# Patient Record
Sex: FEMALE | Race: AMERICAN INDIAN OR ALASKA NATIVE
[De-identification: names, ages, dates, MRNs, and addresses within clinical notes are randomized per-mention and may not be internally consistent; named-entity substitution may affect disease eponyms.]

---

## 2017-04-19 ENCOUNTER — HOSPITAL ENCOUNTER (EMERGENCY)
Dept: HOSPITAL 5 - ED | Age: 47
Discharge: LEFT BEFORE DISCHARGE | End: 2017-04-19
Payer: COMMERCIAL

## 2017-04-19 VITALS — SYSTOLIC BLOOD PRESSURE: 143 MMHG | DIASTOLIC BLOOD PRESSURE: 78 MMHG

## 2017-04-19 DIAGNOSIS — Z88.0: ICD-10-CM

## 2017-04-19 DIAGNOSIS — R20.8: Primary | ICD-10-CM

## 2017-04-19 DIAGNOSIS — K21.9: ICD-10-CM

## 2017-04-19 DIAGNOSIS — Z88.2: ICD-10-CM

## 2017-04-19 LAB
ANION GAP SERPL CALC-SCNC: 17 MMOL/L
APTT BLD: 38 SEC. (ref 24.2–36.6)
BASOPHILS NFR BLD AUTO: 0.6 % (ref 0–1.8)
BUN SERPL-MCNC: 13 MG/DL (ref 7–17)
BUN/CREAT SERPL: 16.25 %
CALCIUM SERPL-MCNC: 9.3 MG/DL (ref 8.4–10.2)
CHLORIDE SERPL-SCNC: 102.4 MMOL/L (ref 98–107)
CO2 SERPL-SCNC: 24 MMOL/L (ref 22–30)
EOSINOPHIL NFR BLD AUTO: 2.4 % (ref 0–4.3)
GLUCOSE SERPL-MCNC: 113 MG/DL (ref 65–100)
HCT VFR BLD CALC: 38.5 % (ref 30.3–42.9)
HGB BLD-MCNC: 12.1 GM/DL (ref 10.1–14.3)
INR PPP: 0.91 (ref 0.87–1.13)
MCH RBC QN AUTO: 22 PG (ref 28–32)
MCHC RBC AUTO-ENTMCNC: 31 % (ref 30–34)
MCV RBC AUTO: 70 FL (ref 79–97)
PLATELET # BLD: 294 K/MM3 (ref 140–440)
POTASSIUM SERPL-SCNC: 3.8 MMOL/L (ref 3.6–5)
RBC # BLD AUTO: 5.51 M/MM3 (ref 3.65–5.03)
SODIUM SERPL-SCNC: 140 MMOL/L (ref 137–145)
WBC # BLD AUTO: 9.6 K/MM3 (ref 4.5–11)

## 2017-04-19 PROCEDURE — 70450 CT HEAD/BRAIN W/O DYE: CPT

## 2017-04-19 PROCEDURE — 36415 COLL VENOUS BLD VENIPUNCTURE: CPT

## 2017-04-19 PROCEDURE — 84484 ASSAY OF TROPONIN QUANT: CPT

## 2017-04-19 PROCEDURE — 93005 ELECTROCARDIOGRAM TRACING: CPT

## 2017-04-19 PROCEDURE — 93010 ELECTROCARDIOGRAM REPORT: CPT

## 2017-04-19 PROCEDURE — 85610 PROTHROMBIN TIME: CPT

## 2017-04-19 PROCEDURE — 85025 COMPLETE CBC W/AUTO DIFF WBC: CPT

## 2017-04-19 PROCEDURE — 85670 THROMBIN TIME PLASMA: CPT

## 2017-04-19 PROCEDURE — 85730 THROMBOPLASTIN TIME PARTIAL: CPT

## 2017-04-19 PROCEDURE — 80048 BASIC METABOLIC PNL TOTAL CA: CPT

## 2017-04-19 NOTE — CAT SCAN REPORT
Cranial CT without contrast.



History: Acute stroke protocol.



Findings: The brain parenchyma is normal. There is no evidence of 

hemorrhage or infarct. The ventricles are normal in size and contour. 

There are no masses or extra-axial collections. The posterior fossa is 

normal. There is no evidence of hyperdense MCA sign. The calvarium is 

intact. There is minimal mucoperiosteal thickening in the left lateral 

aspect of the sphenoid sinus. The mastoid air cells are normal.



Impression: 1. No intracranial abnormalities.



2. Evidence of minimal sphenoid sinusitis.



Comment: The findings were given by telephone to Dr. Aguayo at 10:10 AM 

on April 19, 2017.

## 2017-04-19 NOTE — EMERGENCY DEPARTMENT REPORT
ED Neuro Deficit HPI





- General


Chief Complaint: Neuro Symptoms/Deficit


Stated Complaint: POSS STROKE


Time Seen by Provider: 04/19/17 09:53


Source: patient


Mode of arrival: Ambulatory


Limitations: No Limitations





- History of Present Illness


Initial Comments: 





46-year-old female presents to the emergency department for evaluation of 

strokelike symptoms.  Patient states she has been having right arm numbness for 

over one week.  This morning at approximately 6:30, she began having right-

sided facial numbness.  She reports numbness to her right cheek and around her 

right eye.  She denies weakness or headache.  There are no other complaints.


-: Gradual, week(s) (1)


Last Observed Normal: 06:30


Location: right face, right arm


Presenting Symptoms: Present: Facial Droop/Numbness (numbness only)


History of same: No


Place: work


Severity: mild


Quality: numb, tingling


Improves With: none


Worsens With: none


On Anticoagulants: No


Context: gradual onset


Associated Symptoms: denies other symptoms


Treatments Prior to Arrival: none





- Related Data


Home Medications: 


 Home Medications











 Medication  Instructions  Recorded  Confirmed  Last Taken


 


No Known Home Medications [No  04/19/17 04/19/17 Unknown





Reported Home Medications]    











Allergies/Adverse Reactions: 


 Allergies











Allergy/AdvReac Type Severity Reaction Status Date / Time


 


Penicillins Allergy  Vomiting Verified 04/19/17 09:41


 


Sulfa (Sulfonamide Allergy  Vomiting Verified 04/19/17 09:41





Antibiotics)     














ED Review of Systems


ROS: 


Stated complaint: POSS STROKE


Other details as noted in HPI





Comment: All other systems reviewed and negative


Neurological: as per HPI, numbness





ED Past Medical Hx





- Past Medical History


Previous Medical History?: Yes


Additional medical history: GERD





- Surgical History


Past Surgical History?: Yes


Additional Surgical History: PARTIAL HYSTERECTOMY





- Family History


Family history: no significant





- Social History


Smoking Status: Never Smoker


Substance Use Type: None





- Medications


Home Medications: 


 Home Medications











 Medication  Instructions  Recorded  Confirmed  Last Taken  Type


 


No Known Home Medications [No  04/19/17 04/19/17 Unknown History





Reported Home Medications]     














ED Neuro Physical Exam





- General


Limitations: No Limitations


General appearance: alert, in no apparent distress


Suspected Stroke: Yes





- Head


Head exam: Present: atraumatic, normocephalic





- Eye


Eye exam: Present: normal appearance, PERRL, EOMI





- ENT


ENT exam: Present: normal exam, normal orophraynx, mucous membranes moist





- Neck


Neck exam: Present: normal inspection, full ROM.  Absent: tenderness





- Respiratory


Respiratory exam: Present: normal lung sounds bilaterally.  Absent: respiratory 

distress





- Cardiovascular


Cardiovascular Exam: Present: regular rate, normal rhythm, normal heart sounds





- GI/Abdominal


GI/Abdominal exam: Present: soft, normal bowel sounds.  Absent: distended, 

tenderness





- Extremities Exam


Extremities exam: Present: normal inspection, full ROM.  Absent: tenderness





- Back Exam


Back exam: Present: normal inspection, full ROM.  Absent: tenderness





- Neurological Exam


Neurological exam: Present: alert, oriented X3, motor sensory deficit





- NIHSS


Assessment Interval: Baseline


1a. Level of Consciousness: alert


1b. LOC Questions: answers correctly


1c. LOC Commands: performs tasks correctly


2. Best Gaze: normal


3. Visual: no visual loss


4. Facial Palsy: normal symmetrical movement


5b. Motor Arm Right: no drift


5a. Motor Arm Left: no drift


6a. Motor Leg Left: no drift


6b. Motor Leg Right: no drift


7. Limb Ataxia: absent


8. Sensory: mild/moderate sensory loss


9. Best Language: no aphasia


10. Dysarthria: normal


11. Extinction/Inattention: no abnormality


Total Score: 1


Stroke Severity: Minor Stroke





- Skin


Skin exam: Present: warm, dry, intact





ED Course





 Vital Signs











  04/19/17 04/19/17 04/19/17





  09:41 10:06 10:07


 


Temperature 98.2 F  


 


Pulse Rate 89  


 


Respiratory 18  





Rate   


 


Blood Pressure 159/92  144/77


 


Blood Pressure   





[Right]   


 


O2 Sat by Pulse 100 100 99





Oximetry   














  04/19/17 04/19/17 04/19/17





  10:09 10:11 10:13


 


Temperature   


 


Pulse Rate 71 66 65


 


Respiratory 14 16 14





Rate   


 


Blood Pressure 144/77 144/77 144/77


 


Blood Pressure   





[Right]   


 


O2 Sat by Pulse 100 99 100





Oximetry   














  04/19/17 04/19/17





  10:18 10:20


 


Temperature  


 


Pulse Rate 73 


 


Respiratory 18 18





Rate  


 


Blood Pressure  


 


Blood Pressure 144/77 





[Right]  


 


O2 Sat by Pulse 99 99





Oximetry  














- Lab Data


Result diagrams: 


 04/19/17 09:53





 04/19/17 09:53





 Lab Results











  04/19/17 04/19/17 04/19/17 Range/Units





  09:53 09:53 09:53 


 


WBC  9.6    (4.5-11.0)  K/mm3


 


RBC  5.51 H    (3.65-5.03)  M/mm3


 


Hgb  12.1    (10.1-14.3)  gm/dl


 


Hct  38.5    (30.3-42.9)  %


 


MCV  70 L    (79-97)  fl


 


MCH  22 L    (28-32)  pg


 


MCHC  31    (30-34)  %


 


RDW  16.5 H    (13.2-15.2)  %


 


Plt Count  294    (140-440)  K/mm3


 


Lymph % (Auto)  37.2 H    (13.4-35.0)  %


 


Mono % (Auto)  7.0    (0.0-7.3)  %


 


Eos % (Auto)  2.4    (0.0-4.3)  %


 


Baso % (Auto)  0.6    (0.0-1.8)  %


 


Lymph #  3.6    (1.2-5.4)  K/mm3


 


Mono #  0.7    (0.0-0.8)  K/mm3


 


Eos #  0.2    (0.0-0.4)  K/mm3


 


Baso #  0.1    (0.0-0.1)  K/mm3


 


Seg Neutrophils %  52.8    (40.0-70.0)  %


 


Seg Neutrophils #  5.1    (1.8-7.7)  K/mm3


 


PT   12.2   (12.2-14.9)  Sec.


 


INR   0.91   (0.87-1.13)  


 


APTT   38.0 H   (24.2-36.6)  Sec.


 


Thrombin Time     (15.1-19.6)  Sec.


 


Sodium    140  (137-145)  mmol/L


 


Potassium    3.8  (3.6-5.0)  mmol/L


 


Chloride    102.4  ()  mmol/L


 


Carbon Dioxide    24  (22-30)  mmol/L


 


Anion Gap    17  mmol/L


 


BUN    13  (7-17)  mg/dL


 


Creatinine    0.8  (0.7-1.2)  mg/dL


 


Estimated GFR    > 60  ml/min


 


BUN/Creatinine Ratio    16.25  %


 


Glucose    113 H  ()  mg/dL


 


Calcium    9.3  (8.4-10.2)  mg/dL


 


Troponin T    < 0.010  (0.00-0.029)  ng/mL














  04/19/17 Range/Units





  09:53 


 


WBC   (4.5-11.0)  K/mm3


 


RBC   (3.65-5.03)  M/mm3


 


Hgb   (10.1-14.3)  gm/dl


 


Hct   (30.3-42.9)  %


 


MCV   (79-97)  fl


 


MCH   (28-32)  pg


 


MCHC   (30-34)  %


 


RDW   (13.2-15.2)  %


 


Plt Count   (140-440)  K/mm3


 


Lymph % (Auto)   (13.4-35.0)  %


 


Mono % (Auto)   (0.0-7.3)  %


 


Eos % (Auto)   (0.0-4.3)  %


 


Baso % (Auto)   (0.0-1.8)  %


 


Lymph #   (1.2-5.4)  K/mm3


 


Mono #   (0.0-0.8)  K/mm3


 


Eos #   (0.0-0.4)  K/mm3


 


Baso #   (0.0-0.1)  K/mm3


 


Seg Neutrophils %   (40.0-70.0)  %


 


Seg Neutrophils #   (1.8-7.7)  K/mm3


 


PT   (12.2-14.9)  Sec.


 


INR   (0.87-1.13)  


 


APTT   (24.2-36.6)  Sec.


 


Thrombin Time  17.4  (15.1-19.6)  Sec.


 


Sodium   (137-145)  mmol/L


 


Potassium   (3.6-5.0)  mmol/L


 


Chloride   ()  mmol/L


 


Carbon Dioxide   (22-30)  mmol/L


 


Anion Gap   mmol/L


 


BUN   (7-17)  mg/dL


 


Creatinine   (0.7-1.2)  mg/dL


 


Estimated GFR   ml/min


 


BUN/Creatinine Ratio   %


 


Glucose   ()  mg/dL


 


Calcium   (8.4-10.2)  mg/dL


 


Troponin T   (0.00-0.029)  ng/mL














- EKG Data


-: EKG Interpreted by Me


EKG shows normal: sinus rhythm, axis, intervals, QRS complexes, ST-T waves


Rate: normal


When compared to previous EKG there are: previous EKG unavailable


Interpretation: normal EKG





- Radiology Data


Radiology results: report reviewed, image reviewed





CT of the brain shows no acute intracranial abnormalities.





- Medical Decision Making





Lab and imaging results reviewed and discussed with the patient.  Due to this 

facility's MRI scanner being unavailable, transfer to another facility was 

offered to the patient.  Patient is refusing transfer and states that she would 

like her daughter to drive her to note facility.  Risks of signing out AGAINST 

MEDICAL ADVICE were discussed with the patient, including worsening of her 

condition and possible death.  Patient expresses understanding of these 

concerns and has signed out AGAINST MEDICAL ADVICE.





- Differential Diagnosis


stroke, Bell's palsy, conversion disorder





- Thrombolytic Inclusion/Exclusion


Thrombolytic Exclusion Criteria: Symptom Onset > 3 Hours


Critical care attestation.: 


If time is entered above; I have spent that time in minutes in the direct care 

of this critically ill patient, excluding procedure time.








ED Disposition


Clinical Impression: 


 Facial paresthesia





Disposition: LEFT AGAINST MEDICAL ADVICE


Is pt being admited?: No


Condition: Stable


Referrals: 


LEE COMER MD [Primary Care Provider] - 3-5 Days


Time of Disposition: 11:22

## 2019-09-16 ENCOUNTER — HOSPITAL ENCOUNTER (EMERGENCY)
Dept: HOSPITAL 5 - ED | Age: 49
Discharge: HOME | End: 2019-09-16
Payer: COMMERCIAL

## 2019-09-16 VITALS — DIASTOLIC BLOOD PRESSURE: 87 MMHG | SYSTOLIC BLOOD PRESSURE: 170 MMHG

## 2019-09-16 DIAGNOSIS — S39.012A: ICD-10-CM
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
## 2019-09-16 DIAGNOSIS — K21.9: ICD-10-CM
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
## 2019-09-16 DIAGNOSIS — Z90.710: ICD-10-CM
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
## 2019-09-16 DIAGNOSIS — Z79.899: ICD-10-CM

## 2019-09-16 DIAGNOSIS — Z88.0: ICD-10-CM

## 2019-09-16 DIAGNOSIS — Z88.6: ICD-10-CM

## 2019-09-16 DIAGNOSIS — S16.1XXA: Primary | ICD-10-CM

## 2019-09-16 PROCEDURE — 72040 X-RAY EXAM NECK SPINE 2-3 VW: CPT

## 2019-09-16 PROCEDURE — 72100 X-RAY EXAM L-S SPINE 2/3 VWS: CPT

## 2019-09-16 PROCEDURE — 99283 EMERGENCY DEPT VISIT LOW MDM: CPT

## 2019-09-16 NOTE — XRAY REPORT
CERVICAL SPINE, 4 VIEWS



INDICATION:  Neck pain.



COMPARISON: None.



IMPRESSION:  Normal alignment.  Mild degenerative disc disease is noted at C3-4 and C5-6. The remaini
ng disc levels and facet joints are unremarkable.  No acute osseous or soft tissue abnormality.    





LUMBOSACRAL SPINE, 3 VIEWS



INDICATION:  Back pain for one month.



COMPARISON: None.



IMPRESSION:  Normal alignment.  Mild discogenic DJD and mild facet arthropathy are identified at all 
levels. No advanced degenerative changes.  No acute osseous or soft tissue abnormality.    



Signer Name: Barber Smith Jr, MD 

Signed: 9/16/2019 8:46 AM

 Workstation Name: [id number]

## 2019-09-16 NOTE — EMERGENCY DEPARTMENT REPORT
ED Back Pain/Injury HPI





- General


Chief Complaint: Back Pain/Injury


Stated Complaint: RT SHOULDER/NECK/BACK PAIN


Time Seen by Provider: 09/16/19 08:15


Source: patient


Limitations: No Limitations





- History of Present Illness


Initial Comments: 





This is a 48-year-old female nontoxic, well nourished in appearance, no acute 

signs of distress presents to the ED with c/o of acute on chronic lower back 

pain.  Patient also stated has right sided neck pain and shoulder pain after 

heavy lifting a patient.  Patient works as an RN ICU.  Patient denies radiation 

of pain.  Patient denies any trauma.  Denies any bladder or bowel instability.  

Patient denies any urinary symptoms.  Denies any fever, chills, nausea, 

vomiting, headache, stiff neck, chest pain or shortness of breath.  Patient 

denies any numbness or tingling.  Patient stated allergies to ibuprofen, Sulfa, 

penicillin, Phenergan and Demerol.


MD Complaint: back pain


-: days(s)


Similar Symptoms Previously: Yes


Place: work


Radiation: none


Severity: mild


Severity scale (0 -10): 8


Quality: aching


Consistency: intermittent


Improves With: immobilization, sitting upright


Worsens With: movement, walking


Context: while lifting, turning/twisting


Associated Symptoms: denies other symptoms.  denies: confusion, weakness, chest 

pain, numbness, difficulty walking, cough, difficulty urinating, diaphoresis, 

incontinence, fever/chills, constipation, headaches, abdominal pain, loss of 

appetite, malaise, nausea/vomiting, rash, seizure, shortness of breath, syncope





- Related Data


                                  Previous Rx's











 Medication  Instructions  Recorded  Last Taken  Type


 


Acetaminophen [Tylenol] 500 mg PO Q6HR PRN #20 tablet 09/16/19 Unknown Rx


 


Cyclobenzaprine [Flexeril] 10 mg PO QHS PRN #10 tablet 09/16/19 Unknown Rx











                                    Allergies











Allergy/AdvReac Type Severity Reaction Status Date / Time


 


ibuprofen Allergy  Hives Verified 09/16/19 07:55


 


Penicillins Allergy  Vomiting Verified 04/19/17 09:41


 


promethazine [From Phenergan] Allergy  Hives Verified 09/16/19 07:55


 


Sulfa (Sulfonamide Allergy  Vomiting Verified 04/19/17 09:41





Antibiotics)     


 


meperidine [From Demerol] AdvReac  Unknown Verified 09/16/19 07:55














ED Review of Systems


ROS: 


Stated complaint: RT SHOULDER/NECK/BACK PAIN


Other details as noted in HPI





Constitutional: denies: chills, fever


Eyes: denies: eye pain, eye discharge, vision change


ENT: denies: ear pain, throat pain


Respiratory: denies: cough, shortness of breath, wheezing


Cardiovascular: denies: chest pain, palpitations


Endocrine: no symptoms reported


Gastrointestinal: denies: abdominal pain, nausea, diarrhea


Genitourinary: denies: urgency, dysuria, discharge


Musculoskeletal: back pain.  denies: joint swelling, arthralgia


Skin: denies: rash, lesions


Neurological: denies: headache, weakness, paresthesias


Psychiatric: denies: anxiety, depression


Hematological/Lymphatic: denies: easy bleeding, easy bruising





ED Past Medical Hx





- Past Medical History


Previous Medical History?: Yes


Additional medical history: GERD.  ectopic pregnancy





- Surgical History


Past Surgical History?: Yes


Additional Surgical History: PARTIAL HYSTERECTOMY.  ovarian cyst removed 2017.  

ectopic pregnancy





- Social History


Smoking Status: Never Smoker


Substance Use Type: None





- Medications


Home Medications: 


                                Home Medications











 Medication  Instructions  Recorded  Confirmed  Last Taken  Type


 


Acetaminophen [Tylenol] 500 mg PO Q6HR PRN #20 tablet 09/16/19  Unknown Rx


 


Cyclobenzaprine [Flexeril] 10 mg PO QHS PRN #10 tablet 09/16/19  Unknown Rx














ED Physical Exam





- General


Limitations: No Limitations


General appearance: alert, in no apparent distress





- Head


Head exam: Present: atraumatic, normocephalic





- Neck


Neck exam: Present: normal inspection, full ROM.  Absent: tenderness, 

meningismus, lymphadenopathy





- Extremities Exam


Extremities exam: Present: normal inspection, full ROM, normal capillary refill.

  Absent: tenderness, joint swelling





- Back Exam


Back exam: Present: normal inspection, full ROM, paraspinal tenderness (cervical

 and lumbar paraspinal).  Absent: tenderness, CVA tenderness (R), CVA tenderness

 (L), muscle spasm, vertebral tenderness, rash noted





- Expanded Back Exam


  ** Expanded


Back exam: Absent: saddle anesthesia


Back exam: Negative Straight Leg Raising: Left, Right





- Neurological Exam


Neurological exam: Present: alert, oriented X3, normal gait





- Psychiatric


Psychiatric exam: Present: normal affect, normal mood





- Skin


Skin exam: Present: warm, dry, intact, normal color.  Absent: rash





ED Course





                                   Vital Signs











  09/16/19





  07:56


 


Temperature 97.8 F


 


Pulse Rate 63


 


Respiratory 18





Rate 


 


Blood Pressure 170/87





[Right] 


 


O2 Sat by Pulse 99





Oximetry 














- Reevaluation(s)


Reevaluation #1: 





09/16/19 08:54


Patient is speaking in full sentences with no signs of distress noted.





ED Medical Decision Making





- Medical Decision Making





This is a 48-year-old female that presents with cervical muscle strain and low 

back strain.  Patient is stable was examined by me.  There is no spinal te

nderness.  There is no cauda equina syndrome during examination.  No bladder or 

bowel instability. Patient received Tylenol and prednisone in the ED which 

stated that her symptoms has resolved and subsided.  X-rays obtained of cervical

 lumbar and dictated by radiologist unremarkable.  Patient is notified of the x-

ray results with no questions noted by the patient.  Patient is discharged with 

Tylenol and and Flexeril.  Patient was instructed not to operate any machinery 

while taking muscle relaxant as they cause her drowsiness.  Patient was referred

 to Follow-up with a primary care doctor in 3-5 days or if symptoms worsen and 

continue return to emergency room as soon as possible.  At time of discharge, 

the patient does not seem toxic or ill in appearance.  No acute signs of 

distress noted.  Patient agrees to discharge treatment plan of care.  No further

 questions noted by the patient.





This chart is dictated with using Dragon Dictation Program


Critical care attestation.: 


If time is entered above; I have spent that time in minutes in the direct care 

of this critically ill patient, excluding procedure time.








ED Disposition


Clinical Impression: 


Cervical muscle strain


Qualifiers:


 Encounter type: initial encounter Qualified Code(s): S16.1XXA - Strain of 

muscle, fascia and tendon at neck level, initial encounter





Low back strain


Qualifiers:


 Encounter type: initial encounter Qualified Code(s): S39.012A - Strain of 

muscle, fascia and tendon of lower back, initial encounter





Disposition: DC-01 TO HOME OR SELFCARE


Is pt being admited?: No


Does the pt Need Aspirin: No


Condition: Stable


Instructions:  Muscle Strain (ED), Cyclobenzaprine (By mouth)


Additional Instructions: 


Follow-up with your primary care doctor in 3-5 days or if symptoms worsen such 

as bladder or bowel stability, chest pain, short of breath, numbness or tingling

 sensation in extremities, headache, dizziness, visual changes, nausea vomiting,

 or abdominal pain, return back to emergency room as was possible.


Take Tylenol and Flexeril as prescribed.  Do not operate heavy machinery while 

taking Flexeril due to sedation


Prescriptions: 


Cyclobenzaprine [Flexeril] 10 mg PO QHS PRN #10 tablet


 PRN Reason: Muscle Spasm


Acetaminophen [Tylenol] 500 mg PO Q6HR PRN #20 tablet


 PRN Reason: Pain , Severe (7-10)


Referrals: 


PRIMARY CARE,MD [Primary Care Provider] - 3-5 Days


LEE WALDROP MD [Staff Physician] - 3-5 Days


[facility] [Outside] - 3-5 Days


[facility] [Outside] - 3-5 Days


Forms:  Work/School Release Form(ED)

## 2019-09-16 NOTE — XRAY REPORT
CERVICAL SPINE, 4 VIEWS



INDICATION:  Neck pain.



COMPARISON: None.



IMPRESSION:  Normal alignment.  Mild degenerative disc disease is noted at C3-4 and C5-6. The remaini
ng disc levels and facet joints are unremarkable.  No acute osseous or soft tissue abnormality.    





LUMBOSACRAL SPINE, 3 VIEWS



INDICATION:  Back pain for one month.



COMPARISON: None.



IMPRESSION:  Normal alignment.  Mild discogenic DJD and mild facet arthropathy are identified at all 
levels. No advanced degenerative changes.  No acute osseous or soft tissue abnormality.    



Signer Name: Barber Smith Jr, MD 

Signed: 9/16/2019 8:46 AM

 Workstation Name: [id number]

## 2021-03-10 ENCOUNTER — HOSPITAL ENCOUNTER (OUTPATIENT)
Dept: HOSPITAL 5 - ED | Age: 51
Setting detail: OBSERVATION
LOS: 2 days | Discharge: HOME | End: 2021-03-12
Attending: INTERNAL MEDICINE | Admitting: INTERNAL MEDICINE
Payer: COMMERCIAL

## 2021-03-10 DIAGNOSIS — R13.10: ICD-10-CM

## 2021-03-10 DIAGNOSIS — Z90.710: ICD-10-CM

## 2021-03-10 DIAGNOSIS — K20.90: Primary | ICD-10-CM

## 2021-03-10 DIAGNOSIS — K21.9: ICD-10-CM

## 2021-03-10 DIAGNOSIS — K22.6: ICD-10-CM

## 2021-03-10 DIAGNOSIS — F41.9: ICD-10-CM

## 2021-03-10 DIAGNOSIS — D50.8: ICD-10-CM

## 2021-03-10 DIAGNOSIS — Z79.899: ICD-10-CM

## 2021-03-10 DIAGNOSIS — E66.01: ICD-10-CM

## 2021-03-10 DIAGNOSIS — E87.6: ICD-10-CM

## 2021-03-10 DIAGNOSIS — Z98.890: ICD-10-CM

## 2021-03-10 LAB
ALBUMIN SERPL-MCNC: 4.2 G/DL (ref 3.9–5)
ALT SERPL-CCNC: 16 UNITS/L (ref 7–56)
BASOPHILS # (AUTO): 0 K/MM3 (ref 0–0.1)
BASOPHILS NFR BLD AUTO: 0.3 % (ref 0–1.8)
BUN SERPL-MCNC: 9 MG/DL (ref 7–17)
BUN/CREAT SERPL: 10 %
CALCIUM SERPL-MCNC: 9.4 MG/DL (ref 8.4–10.2)
EOSINOPHIL # BLD AUTO: 0 K/MM3 (ref 0–0.4)
EOSINOPHIL NFR BLD AUTO: 0.3 % (ref 0–4.3)
HCT VFR BLD CALC: 38.3 % (ref 30.3–42.9)
HEMOLYSIS INDEX: 5
HGB BLD-MCNC: 12.3 GM/DL (ref 10.1–14.3)
INR PPP: 0.95 (ref 0.87–1.13)
LYMPHOCYTES # BLD AUTO: 1.9 K/MM3 (ref 1.2–5.4)
LYMPHOCYTES NFR BLD AUTO: 17.4 % (ref 13.4–35)
MCHC RBC AUTO-ENTMCNC: 32 % (ref 30–34)
MCV RBC AUTO: 69 FL (ref 79–97)
MONOCYTES # (AUTO): 0.3 K/MM3 (ref 0–0.8)
MONOCYTES % (AUTO): 3.1 % (ref 0–7.3)
PLATELET # BLD: 379 K/MM3 (ref 140–440)
RBC # BLD AUTO: 5.59 M/MM3 (ref 3.65–5.03)

## 2021-03-10 PROCEDURE — 96365 THER/PROPH/DIAG IV INF INIT: CPT

## 2021-03-10 PROCEDURE — 86901 BLOOD TYPING SEROLOGIC RH(D): CPT

## 2021-03-10 PROCEDURE — G0378 HOSPITAL OBSERVATION PER HR: HCPCS

## 2021-03-10 PROCEDURE — 96361 HYDRATE IV INFUSION ADD-ON: CPT

## 2021-03-10 PROCEDURE — 96375 TX/PRO/DX INJ NEW DRUG ADDON: CPT

## 2021-03-10 PROCEDURE — 84132 ASSAY OF SERUM POTASSIUM: CPT

## 2021-03-10 PROCEDURE — 96366 THER/PROPH/DIAG IV INF ADDON: CPT

## 2021-03-10 PROCEDURE — 85018 HEMOGLOBIN: CPT

## 2021-03-10 PROCEDURE — 99284 EMERGENCY DEPT VISIT MOD MDM: CPT

## 2021-03-10 PROCEDURE — 36415 COLL VENOUS BLD VENIPUNCTURE: CPT

## 2021-03-10 PROCEDURE — 84703 CHORIONIC GONADOTROPIN ASSAY: CPT

## 2021-03-10 PROCEDURE — 96374 THER/PROPH/DIAG INJ IV PUSH: CPT

## 2021-03-10 PROCEDURE — 96376 TX/PRO/DX INJ SAME DRUG ADON: CPT

## 2021-03-10 PROCEDURE — 86900 BLOOD TYPING SEROLOGIC ABO: CPT

## 2021-03-10 PROCEDURE — 83735 ASSAY OF MAGNESIUM: CPT

## 2021-03-10 PROCEDURE — 71045 X-RAY EXAM CHEST 1 VIEW: CPT

## 2021-03-10 PROCEDURE — 86850 RBC ANTIBODY SCREEN: CPT

## 2021-03-10 PROCEDURE — 85025 COMPLETE CBC W/AUTO DIFF WBC: CPT

## 2021-03-10 PROCEDURE — 43235 EGD DIAGNOSTIC BRUSH WASH: CPT

## 2021-03-10 PROCEDURE — 85610 PROTHROMBIN TIME: CPT

## 2021-03-10 PROCEDURE — C9113 INJ PANTOPRAZOLE SODIUM, VIA: HCPCS

## 2021-03-10 PROCEDURE — 85014 HEMATOCRIT: CPT

## 2021-03-10 PROCEDURE — 80053 COMPREHEN METABOLIC PANEL: CPT

## 2021-03-10 PROCEDURE — 96367 TX/PROPH/DG ADDL SEQ IV INF: CPT

## 2021-03-10 PROCEDURE — 83036 HEMOGLOBIN GLYCOSYLATED A1C: CPT

## 2021-03-10 PROCEDURE — 83690 ASSAY OF LIPASE: CPT

## 2021-03-10 RX ADMIN — Medication SCH ML: at 21:32

## 2021-03-10 RX ADMIN — PANTOPRAZOLE SODIUM SCH MG: 40 INJECTION, POWDER, FOR SOLUTION INTRAVENOUS at 21:31

## 2021-03-10 RX ADMIN — ONDANSETRON PRN MG: 2 INJECTION INTRAMUSCULAR; INTRAVENOUS at 19:29

## 2021-03-10 NOTE — HISTORY AND PHYSICAL REPORT
History of Present Illness


Date of examination: 03/10/21


Date of admission: 


03/10/21 17:16





Chief complaint: 


Vomiting for 1 day


Difficulty swallowing for couple of days





History of present illness: 


50-year-old female with history of esophagitis and dysphagia and having hernia 

comes in for having trouble with swallowing food.  Also vomiting.  Symptoms 

present for several days.  Patient required hospitalization 3 years ago for 

dysphagia.  She states she had a Irais-Weller tear at that time.  Patient is in

severe discomfort and unable to swallow.  Also blood-streaked emesis.  But no 

sean blood.




















- Past Medical History


Previous Medical History?: Yes


--GERD: Yes


Additional medical history: GERD.  ectopic pregnancy





- Surgical History


Past Surgical History?: Yes


Additional Surgical History: PARTIAL HYSTERECTOMY.  ovarian cyst removed 2017.  

ectopic pregnancy





- Social History


Smoking Status: Never Smoker


Substance Use Type: None





- Medications


Home Medications: 


                                Home Medications











 Medication  Instructions  Recorded  Confirmed  Last Taken  Type


 


Acetaminophen [Tylenol] 500 mg PO Q6HR PRN #20 tablet 09/16/19  Unknown Rx


 


Cyclobenzaprine [Flexeril] 10 mg PO QHS PRN #10 tablet 09/16/19  Unknown Rx














Review of Systems


ROS: 


Stated complaint: DIFF BREATHING


Other details as noted in HPI





Comment: All other systems reviewed and negative


Constitutional: denies: fever, malaise


Respiratory: denies: cough, shortness of breath


Cardiovascular: chest pain


Gastrointestinal: nausea, vomiting, hematemesis














Medications and Allergies


                                    Allergies











Allergy/AdvReac Type Severity Reaction Status Date / Time


 


ibuprofen Allergy  Hives Verified 03/10/21 14:44


 


Penicillins Allergy  Vomiting Verified 03/10/21 14:44


 


promethazine [From Phenergan] Allergy  Hives Verified 03/10/21 14:44


 


Sulfa (Sulfonamide Allergy  Vomiting Verified 03/10/21 14:44





Antibiotics)     


 


meperidine [From Demerol] AdvReac  Unknown Verified 03/10/21 14:44











                                Home Medications











 Medication  Instructions  Recorded  Confirmed  Last Taken  Type


 


Acetaminophen [Tylenol] 500 mg PO Q6HR PRN #20 tablet 09/16/19  Unknown Rx


 


Cyclobenzaprine [Flexeril] 10 mg PO QHS PRN #10 tablet 09/16/19  Unknown Rx














Exam





- Constitutional


Vitals: 


                                        











Temp Pulse Resp BP Pulse Ox


 


 98 F   68   16   153/97   99 


 


 03/10/21 18:00  03/10/21 18:18  03/10/21 18:18  03/10/21 18:18  03/10/21 18:18











General appearance: Present: no acute distress, well-nourished





- EENT


Eyes: Present: PERRL


ENT: hearing intact, clear oral mucosa





- Neck


Neck: Present: supple, normal ROM





- Respiratory


Respiratory effort: normal


Respiratory: bilateral: CTA





- Cardiovascular


Heart rate: 78


Rhythm: regular


Heart Sounds: Present: S1 & S2.  Absent: rub, click





- Extremities


Extremities: pulses symmetrical, No edema


Peripheral Pulses: within normal limits





- Abdominal


General gastrointestinal: Present: soft, non-tender, non-distended, normal bowel

 sounds


Female genitourinary: Present: normal





- Integumentary


Integumentary: Present: clear, warm, dry





- Musculoskeletal


Musculoskeletal: gait normal, strength equal bilaterally





- Psychiatric


Psychiatric: appropriate mood/affect, intact judgment & insight





- Neurologic


Neurologic: CNII-XII intact, moves all extremities





Results





- Labs


CBC & Chem 7: 


                                 03/10/21 14:39





                                 03/10/21 14:39


Labs: 


                             Laboratory Last Values











WBC  11.2 K/mm3 (4.5-11.0)  H  03/10/21  14:39    


 


RBC  5.59 M/mm3 (3.65-5.03)  H  03/10/21  14:39    


 


Hgb  12.3 gm/dl (10.1-14.3)   03/10/21  14:39    


 


Hct  38.3 % (30.3-42.9)   03/10/21  14:39    


 


MCV  69 fl (79-97)  L  03/10/21  14:39    


 


MCH  22 pg (28-32)  L  03/10/21  14:39    


 


MCHC  32 % (30-34)   03/10/21  14:39    


 


RDW  16.2 % (13.2-15.2)  H  03/10/21  14:39    


 


Plt Count  379 K/mm3 (140-440)   03/10/21  14:39    


 


Lymph % (Auto)  17.4 % (13.4-35.0)   03/10/21  14:39    


 


Mono % (Auto)  3.1 % (0.0-7.3)   03/10/21  14:39    


 


Eos % (Auto)  0.3 % (0.0-4.3)   03/10/21  14:39    


 


Baso % (Auto)  0.3 % (0.0-1.8)   03/10/21  14:39    


 


Lymph # (Auto)  1.9 K/mm3 (1.2-5.4)   03/10/21  14:39    


 


Mono # (Auto)  0.3 K/mm3 (0.0-0.8)   03/10/21  14:39    


 


Eos # (Auto)  0.0 K/mm3 (0.0-0.4)   03/10/21  14:39    


 


Baso # (Auto)  0.0 K/mm3 (0.0-0.1)   03/10/21  14:39    


 


Seg Neutrophils %  78.9 % (40.0-70.0)  H  03/10/21  14:39    


 


Seg Neutrophils #  8.8 K/mm3 (1.8-7.7)  H  03/10/21  14:39    


 


PT  12.5 Sec. (12.2-14.9)   03/10/21  14:39    


 


INR  0.95  (0.87-1.13)   03/10/21  14:39    


 


Sodium  138 mmol/L (137-145)   03/10/21  14:39    


 


Potassium  3.3 mmol/L (3.6-5.0)  L  03/10/21  14:39    


 


Chloride  99.3 mmol/L ()   03/10/21  14:39    


 


Carbon Dioxide  21 mmol/L (22-30)  L  03/10/21  14:39    


 


Anion Gap  21 mmol/L  03/10/21  14:39    


 


BUN  9 mg/dL (7-17)   03/10/21  14:39    


 


Creatinine  0.9 mg/dL (0.6-1.2)   03/10/21  14:39    


 


Estimated GFR  > 60 ml/min  03/10/21  14:39    


 


BUN/Creatinine Ratio  10 %  03/10/21  14:39    


 


Glucose  167 mg/dL ()  H  03/10/21  14:39    


 


Calcium  9.4 mg/dL (8.4-10.2)   03/10/21  14:39    


 


Total Bilirubin  0.40 mg/dL (0.1-1.2)   03/10/21  14:39    


 


AST  19 units/L (5-40)   03/10/21  14:39    


 


ALT  16 units/L (7-56)   03/10/21  14:39    


 


Alkaline Phosphatase  73 units/L ()   03/10/21  14:39    


 


Total Protein  7.8 g/dL (6.3-8.2)   03/10/21  14:39    


 


Albumin  4.2 g/dL (3.9-5)   03/10/21  14:39    


 


Albumin/Globulin Ratio  1.2 %  03/10/21  14:39    


 


Lipase  42 units/L (13-60)   03/10/21  14:39    


 


HCG, Qual  Negative  (Negative)   03/10/21  14:47    


 


Blood Type  O POSITIVE   03/10/21  14:39    


 


Antibody Screen  Negative   03/10/21  14:39    








                                    Short CBC











  03/10/21 Range/Units





  14:39 


 


WBC  11.2 H  (4.5-11.0)  K/mm3


 


Hgb  12.3  (10.1-14.3)  gm/dl


 


Hct  38.3  (30.3-42.9)  %


 


Plt Count  379  (140-440)  K/mm3








                                       BMP











  03/10/21





  14:39


 


Sodium  138


 


Potassium  3.3 L


 


Chloride  99.3


 


Carbon Dioxide  21 L


 


BUN  9


 


Creatinine  0.9


 


Glucose  167 H


 


Calcium  9.4








                                 Liver Function











  03/10/21 Range/Units





  14:39 


 


Total Bilirubin  0.40  (0.1-1.2)  mg/dL


 


AST  19  (5-40)  units/L


 


ALT  16  (7-56)  units/L


 


Alkaline Phosphatase  73  ()  units/L


 


Albumin  4.2  (3.9-5)  g/dL














- Imaging and Cardiology


EKG: report reviewed (Normal sinus rhythm no acute findings)


Chest x-ray: report reviewed (No acute findings)





Assessment and Plan


Advance Directives: Yes (Full code)


VTE prophylaxis?: Chemical


Plan of care discussed with patient/family: Yes





- Patient Problems


(1) Esophagitis


Current Visit: Yes   Status: Acute   


Plan to address problem: 


Severe esophagitis


Differential diagnosis of candidiasis


Patient started on IV Protonix and IV Diflucan


GI consult requested








(2) Dysphagia


Current Visit: Yes   Status: Acute   


Qualifiers: 


   Dysphagia type: esophageal phase   Qualified Code(s): R13.10 - Dysphagia, 

unspecified   


Plan to address problem: 


GI consult requested for possible EGD


No sean hematemesis








(3) History of Irais-Weller syndrome


Current Visit: Yes   Status: Chronic   


Plan to address problem: 


No sean hematemesis


No Irais-Weller syndrome at this point








(4) Hypokalemia


Current Visit: Yes   Status: Acute   


Plan to address problem: 


Supplemented








(5) DVT prophylaxis


Current Visit: Yes   Status: Acute   


Plan to address problem: 


On SCDs and GI prophylaxis

## 2021-03-10 NOTE — EVENT NOTE
ED Screening Note


ED Screening Note: 





ACTIVELY VOMITING DARK BLOOD IN TRIAGE





HX


GERD


H HERNIA








This initial assessment/diagnostic orders/clinical plan/treatment(s) is/are 

subject to change based on patients health status, clinical progression and re-

assessment by fellow clinical providers in the ED. Further treatment and workup 

at subsequent clinical providers discretion. Patient/guardian urged not to elope

from the ED as their condition may be serious if not clinically assessed and 

managed. 





Initial orders include: 


LABS


PPI

## 2021-03-10 NOTE — EMERGENCY DEPARTMENT REPORT
ED GI Bleed HPI





- General


Chief complaint: GI Bleed


Stated complaint: DIFF BREATHING


Time Seen by Provider: 03/10/21 14:33


Source: patient


Mode of arrival: Ambulatory


Limitations: No Limitations





- History of Present Illness


Initial comments: 





Chief complaint: Vomiting, having a hard time swallowing





HPI: This is a 50-year-old female with history of esophagitis, dysphagia, GERD, 

hiatal hernia who presents with chest burning, trouble swallowing food, vom

iting.  Symptoms present for several days with worsening symptoms.  3 years ago 

she required hospitalization due to severe dysphagia.  She had 3 previous 

episodes of "esophageal tears".  She has not followed up with GI specialist in 

several years.  Patient is in severe discomfort.  Patient has had reported 

hematemesis.


MD complaint: blood streaked emesis


-: Gradual, days(s) (Over several days time)


Severity scale (0 -10): 9


Quality: burning


Consistency: constant


Improves with: none


Worsens with: other (Swallowing)


Context: history of GI bleed





- Related Data


                                  Previous Rx's











 Medication  Instructions  Recorded  Last Taken  Type


 


Acetaminophen [Tylenol] 500 mg PO Q6HR PRN #20 tablet 09/16/19 Unknown Rx


 


Cyclobenzaprine [Flexeril] 10 mg PO QHS PRN #10 tablet 09/16/19 Unknown Rx











                                    Allergies











Allergy/AdvReac Type Severity Reaction Status Date / Time


 


ibuprofen Allergy  Hives Verified 03/10/21 14:44


 


Penicillins Allergy  Vomiting Verified 03/10/21 14:44


 


promethazine [From Phenergan] Allergy  Hives Verified 03/10/21 14:44


 


Sulfa (Sulfonamide Allergy  Vomiting Verified 03/10/21 14:44





Antibiotics)     


 


meperidine [From Demerol] AdvReac  Unknown Verified 03/10/21 14:44














ED Review of Systems


ROS: 


Stated complaint: DIFF BREATHING


Other details as noted in HPI





Comment: All other systems reviewed and negative


Constitutional: denies: fever, malaise


Respiratory: denies: cough, shortness of breath


Cardiovascular: chest pain


Gastrointestinal: nausea, vomiting, hematemesis





ED Past Medical Hx





- Past Medical History


Previous Medical History?: Yes


Hx GERD: Yes


Additional medical history: GERD.  ectopic pregnancy





- Surgical History


Past Surgical History?: Yes


Additional Surgical History: PARTIAL HYSTERECTOMY.  ovarian cyst removed 2017.  

ectopic pregnancy





- Social History


Smoking Status: Never Smoker


Substance Use Type: None





- Medications


Home Medications: 


                                Home Medications











 Medication  Instructions  Recorded  Confirmed  Last Taken  Type


 


Acetaminophen [Tylenol] 500 mg PO Q6HR PRN #20 tablet 09/16/19  Unknown Rx


 


Cyclobenzaprine [Flexeril] 10 mg PO QHS PRN #10 tablet 09/16/19  Unknown Rx














ED Physical Exam





- General


Limitations: No Limitations


General appearance: alert, in no apparent distress, other (Patient is constantly

 spitting into emesis bag)





- Head


Head exam: Present: atraumatic, normocephalic





- Eye


Eye exam: Present: normal appearance





- ENT


ENT exam: Present: mucous membranes moist





- Neck


Neck exam: Present: normal inspection





- Respiratory


Respiratory exam: Present: normal lung sounds bilaterally.  Absent: respiratory 

distress, wheezes, rales, rhonchi





- Cardiovascular


Cardiovascular Exam: Present: regular rate, normal rhythm, normal heart sounds. 

 Absent: systolic murmur, diastolic murmur, rubs, gallop





- GI/Abdominal


GI/Abdominal exam: Present: soft, normal bowel sounds.  Absent: distended, 

tenderness, guarding, rebound





- Extremities Exam


Extremities exam: Present: normal inspection





- Back Exam


Back exam: Present: normal inspection





- Neurological Exam


Neurological exam: Present: alert, oriented X3





- Psychiatric


Psychiatric exam: Present: normal affect, normal mood





- Skin


Skin exam: Present: warm, dry, intact, normal color.  Absent: rash





ED Course


                                   Vital Signs











  03/10/21 03/10/21 03/10/21





  14:55 15:34 15:59


 


Pulse Rate 92 H 65 72


 


Respiratory  18 20





Rate   


 


Blood Pressure 125/67  143/87





[Left]   


 


O2 Sat by Pulse 99 100 100





Oximetry   














ED Medical Decision Making





- Lab Data


Result diagrams: 


                                 03/10/21 14:39





                                 03/10/21 14:39





- Radiology Data


Radiology results: report reviewed





Chest 1 view: No acute findings





- Medical Decision Making





1.  Dysphagia due to severe esophagitis, possible Irais-Weller tear: Patient is

 admitted to hospitalist with GI consultation.  Patient received IV fluid 

therapy as well as IV Protonix.  Patient symptoms improved with IV lorazepam and

 IV morphine.





Dr. Francis, GI specialist, plans to perform EGD tomorrow.  Recommendations: 

NPO, PPI BID


Critical care attestation.: 


If time is entered above; I have spent that time in minutes in the direct care 

of this critically ill patient, excluding procedure time.








ED Disposition


Clinical Impression: 


 Esophagitis, Dysphagia, History of Irais-Weller syndrome





Disposition: DC-09 OP ADMIT IP TO THIS HOSP


Is pt being admited?: Yes


Does the pt Need Aspirin: No


Condition: Stable


Referrals: 


PRIMARY CARE,MD [Primary Care Provider] - 3-5 Days


Forms:  Accompanied Note

## 2021-03-10 NOTE — XRAY REPORT
CHEST 1 VIEW



INDICATION / CLINICAL INFORMATION:

dyspnea.



COMPARISON: 

None available.



FINDINGS:



SUPPORT DEVICES: None.



HEART / MEDIASTINUM: No significant abnormality. 



LUNGS / PLEURA: No significant pulmonary or pleural abnormality. No pneumothorax. 



ADDITIONAL FINDINGS: No significant additional findings.



IMPRESSION:

1. No acute findings.



Signer Name: Kiko Rizvi MD 

Signed: 3/10/2021 4:16 PM

Workstation Name: [id number]

## 2021-03-11 ENCOUNTER — OUT OF OFFICE VISIT (OUTPATIENT)
Dept: URBAN - METROPOLITAN AREA MEDICAL CENTER 41 | Facility: MEDICAL CENTER | Age: 51
End: 2021-03-11

## 2021-03-11 DIAGNOSIS — R13.19 CERVICAL DYSPHAGIA: ICD-10-CM

## 2021-03-11 DIAGNOSIS — R11.2 ACUTE NAUSEA WITH NONBILIOUS VOMITING: ICD-10-CM

## 2021-03-11 DIAGNOSIS — R71.8 MICROCYTOSIS: ICD-10-CM

## 2021-03-11 DIAGNOSIS — K92.0 BLOODY EMESIS: ICD-10-CM

## 2021-03-11 LAB
ALBUMIN SERPL-MCNC: 3.8 G/DL (ref 3.9–5)
ALT SERPL-CCNC: 15 UNITS/L (ref 7–56)
BASOPHILS # (AUTO): 0 K/MM3 (ref 0–0.1)
BASOPHILS NFR BLD AUTO: 0.3 % (ref 0–1.8)
BUN SERPL-MCNC: 11 MG/DL (ref 7–17)
BUN/CREAT SERPL: 14 %
CALCIUM SERPL-MCNC: 8.8 MG/DL (ref 8.4–10.2)
EOSINOPHIL # BLD AUTO: 0 K/MM3 (ref 0–0.4)
EOSINOPHIL NFR BLD AUTO: 0 % (ref 0–4.3)
HCT VFR BLD CALC: 38.2 % (ref 30.3–42.9)
HEMOLYSIS INDEX: 21
HGB BLD-MCNC: 12.1 GM/DL (ref 10.1–14.3)
LYMPHOCYTES # BLD AUTO: 1.3 K/MM3 (ref 1.2–5.4)
LYMPHOCYTES NFR BLD AUTO: 12.7 % (ref 13.4–35)
MCHC RBC AUTO-ENTMCNC: 32 % (ref 30–34)
MCV RBC AUTO: 70 FL (ref 79–97)
MONOCYTES # (AUTO): 0.6 K/MM3 (ref 0–0.8)
MONOCYTES % (AUTO): 5.8 % (ref 0–7.3)
PLATELET # BLD: 341 K/MM3 (ref 140–440)
RBC # BLD AUTO: 5.5 M/MM3 (ref 3.65–5.03)

## 2021-03-11 PROCEDURE — 99244 OFF/OP CNSLTJ NEW/EST MOD 40: CPT | Performed by: INTERNAL MEDICINE

## 2021-03-11 RX ADMIN — SODIUM CHLORIDE SCH MLS/HR: 0.9 INJECTION, SOLUTION INTRAVENOUS at 18:32

## 2021-03-11 RX ADMIN — ONDANSETRON PRN MG: 2 INJECTION INTRAMUSCULAR; INTRAVENOUS at 01:11

## 2021-03-11 RX ADMIN — PANTOPRAZOLE SODIUM SCH MG: 40 INJECTION, POWDER, FOR SOLUTION INTRAVENOUS at 22:19

## 2021-03-11 RX ADMIN — POTASSIUM CHLORIDE SCH MLS/HR: 10 INJECTION, SOLUTION INTRAVENOUS at 13:47

## 2021-03-11 RX ADMIN — POTASSIUM CHLORIDE SCH MLS/HR: 10 INJECTION, SOLUTION INTRAVENOUS at 12:38

## 2021-03-11 RX ADMIN — POTASSIUM CHLORIDE SCH MLS/HR: 10 INJECTION, SOLUTION INTRAVENOUS at 11:27

## 2021-03-11 RX ADMIN — POTASSIUM CHLORIDE SCH MLS/HR: 10 INJECTION, SOLUTION INTRAVENOUS at 08:58

## 2021-03-11 RX ADMIN — SODIUM CHLORIDE SCH MLS/HR: 0.9 INJECTION, SOLUTION INTRAVENOUS at 03:27

## 2021-03-11 RX ADMIN — Medication SCH: at 08:58

## 2021-03-11 RX ADMIN — PANTOPRAZOLE SODIUM SCH MG: 40 INJECTION, POWDER, FOR SOLUTION INTRAVENOUS at 08:58

## 2021-03-11 NOTE — POST OPERATIVE NOTE
Pre-op diagnosis: Hematemesis, dysphagia


Post-op diagnosis: other (Erosive esophagitis, hiatal hernia)


Findings: 


1.  Near circumferential white-based ulcers in distal 1 cm of esophagus, at 33 

cm from incisors, no stricturing noted.


2.  5 cm hiatal hernia, from 34 to 39 cm from incisors.


3.  Otherwise normal esophagus.


4.  Normal stomach.


5.  Normal duodenum.





Procedure: 


EGD





Anesthesia: MAC


Surgeon: MARIA ANTONIA MISHRA


Estimated blood loss: none


Pathology: none


Condition: stable


Disposition: floor (If does well, can D/C on bid PPI.  If remains symptomatic, 

get GB ultrasound.)

## 2021-03-11 NOTE — PROGRESS NOTE
Assessment and Plan


Assessment and plan: 


--Erosive esophagitis


Current Visit: Yes   Status: Acute   


Plan to address problem: 


GI evaluation noted and appreciated


Status post EGD





-- Dysphagia/hematemesis


Current Visit: Yes   Status: Acute   


Plan to address problem: 


GI evaluated patient


s/p EGD;Near circumferential white based ulcers distal esophagus


Erosive esophagitis,Hiatal hernia.,Advised twice daily PPI


If no improvement abdominal ultrasound, gallbladder evaluation





--Hypokalemia


Current Visit: Yes   Status: Acute   


Plan to address problem: 


Supplemented


Monitor electrolytes





--Morbid obesity ; BMI 39.8 


Current Visit: Yes   Status: Chronic. 


Plan to address problem: 


 Diet modification, lifestyle changes, 


exercise as tolerated and weight reduction 


when medically stable





--General anxiety;


Current Visit: Yes   Status: Chronic. 


Plan to address problem: 


Advised to see private psychologist/psychiatrist 


for further evaluation and management





--DVT prophylaxisadvise


Plan to address problem: 


Current Visit: Yes   Status: Acute.  


On SCDs and GI prophylaxis





We will closely monitor the patient


Advance diet as tolerated, if symptoms improve


DC tomorrow


If symptoms persist, check abdominal ultrasound


To evaluate for gallbladder disease





Plan of care reviewed with the patient and her nurse





Disposition DC home tomorrow if stable




















History


Interval history: 


I have seen and examined the patient at the bedside


Patient's chart and medications reviewed


Patient underwent EGD, findings noted


Patient feels slightly better still complains of








Hospitalist Physical





- Constitutional


Vitals: 


                                        











Temp Pulse Resp BP Pulse Ox


 


 98.1 F   70   13   138/80   100 


 


 03/11/21 09:48  03/11/21 09:48  03/11/21 09:48  03/11/21 09:48  03/11/21 09:48











General appearance: Present: mild distress, other (Anxious appearing)





- EENT


Eyes: Present: PERRL, EOM intact





- Neck


Neck: Present: supple, normal ROM





- Respiratory


Respiratory effort: normal


Respiratory: bilateral: diminished, negative: rales, rhonchi, wheezing





- Cardiovascular


Rhythm: regular


Heart Sounds: Present: S1 & S2





- Extremities


Extremities: no ischemia, No edema





- Abdominal


General gastrointestinal: soft, non-tender, non-distended, normal bowel sounds





- Integumentary


Integumentary: Present: clear, warm





- Psychiatric


Psychiatric: appropriate mood/affect, other (Anxious)





- Neurologic


Neurologic: moves all extremities





Results





- Labs


CBC & Chem 7: 


                                 03/11/21 08:20





                                 03/11/21 08:20


Labs: 


                             Laboratory Last Values











WBC  9.9 K/mm3 (4.5-11.0)   03/11/21  08:20    


 


RBC  5.50 M/mm3 (3.65-5.03)  H  03/11/21  08:20    


 


Hgb  12.1 gm/dl (10.1-14.3)   03/11/21  08:20    


 


Hct  38.2 % (30.3-42.9)   03/11/21  08:20    


 


MCV  70 fl (79-97)  L  03/11/21  08:20    


 


MCH  22 pg (28-32)  L  03/11/21  08:20    


 


MCHC  32 % (30-34)   03/11/21  08:20    


 


RDW  16.5 % (13.2-15.2)  H  03/11/21  08:20    


 


Plt Count  341 K/mm3 (140-440)   03/11/21  08:20    


 


Lymph % (Auto)  12.7 % (13.4-35.0)  L  03/11/21  08:20    


 


Mono % (Auto)  5.8 % (0.0-7.3)   03/11/21  08:20    


 


Eos % (Auto)  0.0 % (0.0-4.3)   03/11/21  08:20    


 


Baso % (Auto)  0.3 % (0.0-1.8)   03/11/21  08:20    


 


Lymph # (Auto)  1.3 K/mm3 (1.2-5.4)   03/11/21  08:20    


 


Mono # (Auto)  0.6 K/mm3 (0.0-0.8)   03/11/21  08:20    


 


Eos # (Auto)  0.0 K/mm3 (0.0-0.4)   03/11/21  08:20    


 


Baso # (Auto)  0.0 K/mm3 (0.0-0.1)   03/11/21  08:20    


 


Seg Neutrophils %  81.2 % (40.0-70.0)  H  03/11/21  08:20    


 


Seg Neutrophils #  8.0 K/mm3 (1.8-7.7)  H  03/11/21  08:20    


 


PT  12.5 Sec. (12.2-14.9)   03/10/21  14:39    


 


INR  0.95  (0.87-1.13)   03/10/21  14:39    


 


Sodium  137 mmol/L (137-145)   03/11/21  08:20    


 


Potassium  3.5 mmol/L (3.6-5.0)  L  03/11/21  08:20    


 


Chloride  99.9 mmol/L ()   03/11/21  08:20    


 


Carbon Dioxide  25 mmol/L (22-30)   03/11/21  08:20    


 


Anion Gap  16 mmol/L  03/11/21  08:20    


 


BUN  11 mg/dL (7-17)   03/11/21  08:20    


 


Creatinine  0.8 mg/dL (0.6-1.2)   03/11/21  08:20    


 


Estimated GFR  > 60 ml/min  03/11/21  08:20    


 


BUN/Creatinine Ratio  14 %  03/11/21  08:20    


 


Glucose  122 mg/dL ()  H  03/11/21  08:20    


 


Hemoglobin A1c  6.2 % (4-6)  H  03/11/21  08:20    


 


Calcium  8.8 mg/dL (8.4-10.2)   03/11/21  08:20    


 


Total Bilirubin  0.30 mg/dL (0.1-1.2)   03/11/21  08:20    


 


AST  18 units/L (5-40)   03/11/21  08:20    


 


ALT  15 units/L (7-56)   03/11/21  08:20    


 


Alkaline Phosphatase  63 units/L ()   03/11/21  08:20    


 


Total Protein  7.0 g/dL (6.3-8.2)   03/11/21  08:20    


 


Albumin  3.8 g/dL (3.9-5)  L  03/11/21  08:20    


 


Albumin/Globulin Ratio  1.2 %  03/11/21  08:20    


 


Lipase  42 units/L (13-60)   03/10/21  14:39    


 


HCG, Qual  Negative  (Negative)   03/10/21  14:47    


 


Blood Type  O POSITIVE   03/10/21  14:39    


 


Antibody Screen  Negative   03/10/21  14:39    











Wilkerson/IV: 


                                        





Voiding Method                   Toilet











Active Medications





- Current Medications


Current Medications: 














Generic Name Dose Route Start Last Admin





  Trade Name Freq  PRN Reason Stop Dose Admin


 


Acetaminophen  650 mg  03/10/21 18:28 





  Acetaminophen 325 Mg Tab  PO  





  Q4H PRN  





  Pain MILD(1-3)/Fever >100.5/HA  


 


Cyclobenzaprine HCl  10 mg  03/10/21 18:27 





  Cyclobenzaprine 10 Mg Tab  PO  





  QHS PRN  





  Muscle Spasm  


 


Hydromorphone HCl  1 mg  03/10/21 18:28  03/11/21 01:16





  Hydromorphone 1 Mg/1 Ml Inj  IV   1 mg





  Q3H PRN   Administration





  Pain , Severe (7-10)  


 


Sodium Chloride  1,000 mls @ 75 mls/hr  03/10/21 18:30  03/11/21 03:27





  Nacl 0.9% 1000 Ml  IV   75 mls/hr





  AS DIRECT BENOIT   Administration


 


Fluconazole  200 mls @ 100 mls/hr  03/10/21 23:00  03/10/21 23:07





  Diflucan  IV   100 mls/hr





  Q24H BENOIT   Administration





  Protocol  


 


Potassium Chloride  10 meq in 100 mls @ 100 mls/hr  03/11/21 09:00  03/11/21 

08:58





  Kcl 10meq/100ml  IV  03/11/21 12:59  100 mls/hr





  Q1H BENOIT   Administration


 


Morphine Sulfate  2 mg  03/10/21 18:28  03/10/21 19:30





  Morphine 2 Mg/1 Ml Inj  IV   2 mg





  Q4H PRN   Administration





  Pain, Moderate (4-6)  


 


Ondansetron HCl  4 mg  03/10/21 18:28  03/11/21 01:11





  Ondansetron 4 Mg/2 Ml Inj  IV   4 mg





  Q8H PRN   Administration





  Nausea And Vomiting  


 


Pantoprazole Sodium  40 mg  03/10/21 22:00  03/11/21 08:58





  Pantoprazole 40 Mg Inj  IV   40 mg





  BID BENOIT   Administration


 


Sodium Chloride  10 ml  03/10/21 22:00  03/11/21 08:58





  Sodium Chloride 0.9% 10 Ml Flush Syringe  IV   Not Given





  BID BENOIT  


 


Sodium Chloride  10 ml  03/10/21 18:28 





  Sodium Chloride 0.9% 10 Ml Flush Syringe  IV  





  PRN PRN  





  LINE FLUSH

## 2021-03-11 NOTE — ANESTHESIA CONSULTATION
Anesthesia Consult and Med Hx


Date of service: 03/11/21





- Airway


Anesthetic Teeth Evaluation: Good


ROM Head & Neck: Adequate


Mental/Hyoid Distance: Adequate


Mallampati Class: Class III


Intubation Access Assessment: Possibly Difficult





- Pre-Operative Health Status


ASA Pre-Surgery Classification: ASA3


Proposed Anesthetic Plan: MAC





- Pulmonary


Hx Smoking: No


Hx Asthma: No


Hx Respiratory Symptoms: No


SOB: No


COPD: No


Home Oxygen Therapy: No


Hx Pneumonia: No


Hx Sleep Apnea: Yes





- Cardiovascular System


Hx Hypertension: Yes (dont take meds/recent diagnosis)


Hx Coronary Artery Disease: No


Hx Heart Attack/AMI: No


Hx Angina: No


Hx Percutaneous Transluminal Coronary Angioplasty (PTCA): No


Hx Cardia Arrhythmia: No


Hx Pacemaker: No


Hx Internal Defibrillator: No


Hx Valvular Heart Disease: No


Hx Heart Murmur: No


Hx Peripheral Vascular Disease: No





- Central Nervous System


Hx Neuromuscular Disorder: No


Hx Seizures: No


CVA: No


Hx Back Pain: No


Hx Psychiatric Problems: No





- Gastrointestinal


Hx Ulcer: Yes


Hx Gastroesophageal Reflux Disease: Yes





- Endocrine


Hx Renal Disease: No


Hx End Stage Renal Disease: No


Hx Cirrhosis: No


Hx Liver Disease: No


Hx Insulin Dependent Diabetes: No


Hx Non-Insulin Dependent Diabetes: No


Hx Thyroid Disease: No


Hx Hypothyroidism: No


Hx Hyperthyroidism: No





- Hematic


Hx Anemia: No


Hx Sickle Cell Disease: No





- Other Systems


Hx Alcohol Use: No


Hx Substance Use: No


Hx Cancer: No


Hx Obesity: Yes

## 2021-03-11 NOTE — OPERATIVE REPORT
UPPER ENDOSCOPY REPORT



PROCEDURE:  Upper endoscopy.



PREOPERATIVE DIAGNOSIS:  Dysphagia and vomiting with blood streaked emesis.



POSTOPERATIVE DIAGNOSES:  Erosive esophagitis and hiatal hernia.



SEDATION:  MAC by Anesthesia.



HISTORY:  The patient is a 50-year-old woman who comes in with approximately

1-week history of difficulty swallowing food as well as vomiting after meals. 

She notes blood streaking her emesis.



DESCRIPTION OF PROCEDURE:  Indications, risks, and benefits were explained and

consent was obtained.  The patient was placed in left lateral decubitus position

and sedated.  Video endoscope was passed through the mouth and oropharynx into

the descending duodenum.  Scope was then gradually withdrawn with close

inspection of mucosa.



FINDINGS:

1.  Near circumferential white based shallow ulcers located in the distal 1 cm

of the esophagus from 33-34 cm from the incisors.  No stricturing noted and

esophagus was otherwise normal.

2.  A 5 cm hiatal hernia from 34-39 cm from the incisors.

3.  Normal stomach.

4.  Normal duodenal bulb and duodenum.



The patient tolerated the procedure well without immediate complication.



IMPRESSION:

1.  Ulcerative esophagitis involving distal 1 cm.

2.  Hiatal hernia.

3.  Otherwise, normal exam.



PLAN:

1.  B.i.d. proton pump inhibitors for 1 month and then make it daily

chronically.

2.  Outpatient followup and repeat endoscopy in 6-8 weeks to ensure healing and

to exclude Leon's esophagus.

3.  Advance diet as tolerated and when can tolerate diet, may discharge to home.





DD: 03/11/2021 10:39

DT: 03/11/2021 10:52

[facility]# [id number]  [id number]

HRC/NTS

## 2021-03-11 NOTE — CONSULTATION
History of Present Illness





- Reason for Consult


Consult date: 03/11/21


Dysphagia


Requesting physician: MONTY SANCHEZ





- History of Present Illness


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 

po intake, with blood tinged emesis.   Pt has > 20 yr hx of GERD, and takes PPI 

intermittently.  She states she had not been having GERD symptoms recently.  No 

prior dysphagia history.  She denies abd pain, melena or hematochezia.  She has 

gained 15# in the last 6 months.  BMs unchanged, usually has constipation.


Last colonoscopy was ~ 3 yrs ago, may have had polyps.  Cannot recall last EGD, 

but was told she had a hiatal hernia.


Of note, she just came back from [place] 1 wk ago.  Saw PCP recently, and was

given BP meds to take transiently.


Meds reviewed.








Past History


Past Medical History: GERD


Past Surgical History: Other (1.  2017 - Oophorectomy for ovarinal cyst.  2.  

Ectopic pregnancy )


Social history: denies: smoking, alcohol abuse





Medications and Allergies


                                    Allergies











Allergy/AdvReac Type Severity Reaction Status Date / Time


 


ibuprofen Allergy  Hives Verified 03/10/21 14:44


 


Penicillins Allergy  Vomiting Verified 03/10/21 14:44


 


promethazine [From Phenergan] Allergy  Hives Verified 03/10/21 14:44


 


Sulfa (Sulfonamide Allergy  Vomiting Verified 03/10/21 14:44





Antibiotics)     


 


meperidine [From Demerol] AdvReac  Unknown Verified 03/10/21 14:44











                                Home Medications











 Medication  Instructions  Recorded  Confirmed  Last Taken  Type


 


Acetaminophen [Tylenol] 500 mg PO Q6HR PRN #20 tablet 09/16/19  Unknown Rx


 


Cyclobenzaprine [Flexeril] 10 mg PO QHS PRN #10 tablet 09/16/19  Unknown Rx











Active Meds: 


Active Medications





Acetaminophen (Acetaminophen 325 Mg Tab)  650 mg PO Q4H PRN


   PRN Reason: Pain MILD(1-3)/Fever >100.5/HA


Cyclobenzaprine HCl (Cyclobenzaprine 10 Mg Tab)  10 mg PO QHS PRN


   PRN Reason: Muscle Spasm


Hydromorphone HCl (Hydromorphone 1 Mg/1 Ml Inj)  1 mg IV Q3H PRN


   PRN Reason: Pain , Severe (7-10)


   Last Admin: 03/11/21 01:16 Dose:  1 mg


   Documented by: 


Sodium Chloride (Nacl 0.9% 1000 Ml)  1,000 mls @ 75 mls/hr IV AS DIRECT BENOIT


   Last Admin: 03/11/21 03:27 Dose:  75 mls/hr


   Documented by: 


Fluconazole (Diflucan)  200 mls @ 100 mls/hr IV Q24H [facility]; Protocol


   Last Admin: 03/10/21 23:07 Dose:  100 mls/hr


   Documented by: 


Potassium Chloride (Kcl 10meq/100ml)  10 meq in 100 mls @ 100 mls/hr IV Q1H [facility]


   Stop: 03/11/21 12:59


   Last Admin: 03/11/21 08:58 Dose:  100 mls/hr


   Documented by: 


Morphine Sulfate (Morphine 2 Mg/1 Ml Inj)  2 mg IV Q4H PRN


   PRN Reason: Pain, Moderate (4-6)


   Last Admin: 03/10/21 19:30 Dose:  2 mg


   Documented by: 


Ondansetron HCl (Ondansetron 4 Mg/2 Ml Inj)  4 mg IV Q8H PRN


   PRN Reason: Nausea And Vomiting


   Last Admin: 03/11/21 01:11 Dose:  4 mg


   Documented by: 


Pantoprazole Sodium (Pantoprazole 40 Mg Inj)  40 mg IV BID [facility]


   Last Admin: 03/11/21 08:58 Dose:  40 mg


   Documented by: 


Sodium Chloride (Sodium Chloride 0.9% 10 Ml Flush Syringe)  10 ml IV BID [facility]


   Last Admin: 03/11/21 08:58 Dose:  Not Given


   Documented by: 


Sodium Chloride (Sodium Chloride 0.9% 10 Ml Flush Syringe)  10 ml IV PRN PRN


   PRN Reason: LINE FLUSH











Review of Systems


All systems: negative (as noted in HPI)





Exam





- Constitutional


Vitals: 


                                        











Temp Pulse Resp BP Pulse Ox


 


 97.8 F   64   18   126/57   94 


 


 03/11/21 07:31  03/11/21 07:31  03/11/21 07:31  03/11/21 07:31  03/11/21 07:31











General appearance: Present: mild distress, other (Anxious appearing)





- EENT


Eyes: Present: PERRL, EOM intact


ENT: hearing intact, clear oral mucosa





- Respiratory


Respiratory effort: normal


Respiratory: bilateral: CTA





- Cardiovascular


Rhythm: regular


Heart Sounds: Present: S1 & S2





- Extremities


Extremities: No edema





- Abdominal


General gastrointestinal: Present: soft, non-tender





Results





- Labs


CBC & Chem 7: 


                                 03/11/21 08:20





                                 03/11/21 08:20


Labs: 


                              Abnormal lab results











  03/10/21 03/10/21 03/11/21 Range/Units





  14:39 14:39 08:20 


 


WBC  11.2 H    (4.5-11.0)  K/mm3


 


RBC  5.59 H   5.50 H  (3.65-5.03)  M/mm3


 


MCV  69 L   70 L  (79-97)  fl


 


MCH  22 L   22 L  (28-32)  pg


 


RDW  16.2 H   16.5 H  (13.2-15.2)  %


 


Lymph % (Auto)    12.7 L  (13.4-35.0)  %


 


Seg Neutrophils %  78.9 H   81.2 H  (40.0-70.0)  %


 


Seg Neutrophils #  8.8 H   8.0 H  (1.8-7.7)  K/mm3


 


Potassium   3.3 L   (3.6-5.0)  mmol/L


 


Carbon Dioxide   21 L   (22-30)  mmol/L


 


Glucose   167 H   ()  mg/dL


 


Hemoglobin A1c     (4-6)  %


 


Albumin     (3.9-5)  g/dL














  03/11/21 03/11/21 Range/Units





  08:20 08:20 


 


WBC    (4.5-11.0)  K/mm3


 


RBC    (3.65-5.03)  M/mm3


 


MCV    (79-97)  fl


 


MCH    (28-32)  pg


 


RDW    (13.2-15.2)  %


 


Lymph % (Auto)    (13.4-35.0)  %


 


Seg Neutrophils %    (40.0-70.0)  %


 


Seg Neutrophils #    (1.8-7.7)  K/mm3


 


Potassium  3.5 L   (3.6-5.0)  mmol/L


 


Carbon Dioxide    (22-30)  mmol/L


 


Glucose  122 H   ()  mg/dL


 


Hemoglobin A1c   6.2 H  (4-6)  %


 


Albumin  3.8 L   (3.9-5)  g/dL














Assessment and Plan


1.  Dysphagia/hematemesis - likely due to GERD.  Hgb normal, with no other 

evidence of significant bleeding.


- PPI


- monitor H/H


- will do EGD





2.  N/V - may be related to GERD.  If EGD unrevealing, will get GB ultrasound.  

May well have significant functional component.





3.  Microcytosis - with normal Hgb.  Chronic, to at least 2017, when it was 70. 

 Hemoglobinopathy vs iron deficiency.


- check iron stores

## 2021-03-12 VITALS — SYSTOLIC BLOOD PRESSURE: 128 MMHG | DIASTOLIC BLOOD PRESSURE: 59 MMHG

## 2021-03-12 LAB
HCT VFR BLD CALC: 36.7 % (ref 30.3–42.9)
HGB BLD-MCNC: 11.7 GM/DL (ref 10.1–14.3)

## 2021-03-12 RX ADMIN — PANTOPRAZOLE SODIUM SCH MG: 40 INJECTION, POWDER, FOR SOLUTION INTRAVENOUS at 09:12

## 2021-03-12 RX ADMIN — Medication SCH ML: at 09:15

## 2021-03-12 RX ADMIN — Medication SCH: at 05:27

## 2021-03-12 NOTE — DISCHARGE SUMMARY
Providers





- Providers


Date of Admission: 


03/11/21 15:55





Date of discharge: 03/12/21


Attending physician: 


AMY J KOCHERLA





                                        





03/10/21 16:56


Consult to Physician [CONS] Stat 


   Comment: 


   Consulting Provider: MARIA ANTONIA MISHRA


   Physician Instructions: 


   Reason For Exam: Severe dysphagia, esophagitis history of Irais-W











Primary care physician: 


PRIMARY CARE MD








Hospitalization


Reason for admission: Dysphagia


Condition: Stable


Pertinent studies: 


Chest x-ray no acute findings





Procedures: 


s/p EGD;Near circumferential white based ulcers distal esophagus


Erosive esophagitis,Hiatal hernia.,Advised twice daily PPI





Hospital course: 


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 

years on PPIs intermittently was admitted through emergency room with 1 week 

history of dysphagia and nausea and vomiting, with mild blood-tinged emesis


Patient was admitted to the hospital symptomatically managed, subsequently 

evaluated by GI, underwent EGD which revealed circumferential white-based ulcers

 at the distal esophagus with erosive esophagitis and hiatal hernia.  GI 

recommended twice daily PPIs


Advance diet as tolerated, and follow-up with GI for further evaluation and m

anagement


Also advised to check abdominal ultrasound if symptoms persist.  However today 

patient is comfortable


No new complaints vital signs stable, physical examination unremarkable


Tolerating regular breakfast.


Patient is hemodynamically and clinically stable at discharge


Patient is given 2 days of work excuse on 3/13/2021 and 3/14/2021


Also advised to return to the emergency room should she have worsening symptoms 

or GI bleeding.


Plan of care reviewed with the patient as well as her nurse





Final diagnosis;





--Erosive esophagitis


--Dysphagia with hematemesis; present on admission/resolved


--Hypokalemia; corrected


--Morbid obesity; BMI 39.8/advised weight reduction when medically stable


--General anxiety; present on admission, resolved





Stable at discharge











Disposition: DC-01 TO HOME OR SELFCARE


Time spent for discharge: 35 min





Core Measure Documentation





- Palliative Care


Palliative Care/ Comfort Measures: Not Applicable





- Core Measures


Any of the following diagnoses?: none





Exam





- Constitutional


Vitals: 


                                        











Temp Pulse Resp BP Pulse Ox


 


 98.6 F   60   16   136/69   100 


 


 03/12/21 04:00  03/12/21 04:00  03/12/21 04:00  03/12/21 04:00  03/12/21 04:00











General appearance: Present: no acute distress, well-nourished, obese





- EENT


Eyes: Present: PERRL, EOM intact





- Neck


Neck: Present: supple, normal ROM





- Respiratory


Respiratory effort: normal


Respiratory: bilateral: diminished, negative: rales, rhonchi, wheezing





- Cardiovascular


Rhythm: regular


Heart Sounds: Present: S1 & S2





- Extremities


Extremities: no ischemia, No edema





- Abdominal


General gastrointestinal: Present: soft, non-tender, non-distended, normal bowel

 sounds





- Integumentary


Integumentary: Present: clear, warm





- Musculoskeletal


Musculoskeletal: strength equal bilaterally





- Psychiatric


Psychiatric: appropriate mood/affect, cooperative





- Neurologic


Neurologic: CNII-XII intact, moves all extremities





Plan


Activity: no restrictions


Diet: other (Diet as tolerated)


Additional Instructions: Avoid NSAID group of pain medications.  Advance diet as

 tolerated.  If you have worsening symptoms contact MD or go to emergency room 

as needed.  Follow with GI  per schedule or as needed.  2 days work 

excuse 3/13/2021 and 3/14/2021.  If you need additional days of work excuse due 

to illness ,check with your primary care physician


Follow up with: 


PRIMARY CARE,MD [Primary Care Provider] - 3-5 Days


MARIA ANTONIA MISHRA MD [Staff Physician] - 14 Days


Forms:  Accompanied Note

## 2021-03-24 ENCOUNTER — HOSPITAL ENCOUNTER (EMERGENCY)
Dept: HOSPITAL 5 - ED | Age: 51
Discharge: HOME | End: 2021-03-24
Payer: COMMERCIAL

## 2021-03-24 DIAGNOSIS — K21.9: ICD-10-CM

## 2021-03-24 DIAGNOSIS — Z98.890: ICD-10-CM

## 2021-03-24 DIAGNOSIS — Z88.8: ICD-10-CM

## 2021-03-24 DIAGNOSIS — Z79.899: ICD-10-CM

## 2021-03-24 DIAGNOSIS — Z90.710: ICD-10-CM

## 2021-03-24 DIAGNOSIS — Z88.0: ICD-10-CM

## 2021-03-24 DIAGNOSIS — Z87.19: ICD-10-CM

## 2021-03-24 DIAGNOSIS — I10: ICD-10-CM

## 2021-03-24 DIAGNOSIS — K20.90: Primary | ICD-10-CM

## 2021-03-24 DIAGNOSIS — R13.10: ICD-10-CM

## 2021-03-24 LAB
ALBUMIN SERPL-MCNC: 4.1 G/DL (ref 3.9–5)
ALT SERPL-CCNC: 18 UNITS/L (ref 7–56)
BASOPHILS # (AUTO): 0 K/MM3 (ref 0–0.1)
BASOPHILS NFR BLD AUTO: 0.4 % (ref 0–1.8)
BUN SERPL-MCNC: 11 MG/DL (ref 7–17)
BUN/CREAT SERPL: 14 %
CALCIUM SERPL-MCNC: 9.4 MG/DL (ref 8.4–10.2)
EOSINOPHIL # BLD AUTO: 0.1 K/MM3 (ref 0–0.4)
EOSINOPHIL NFR BLD AUTO: 0.7 % (ref 0–4.3)
HCT VFR BLD CALC: 37.7 % (ref 30.3–42.9)
HEMOLYSIS INDEX: 18
HGB BLD-MCNC: 12.2 GM/DL (ref 10.1–14.3)
LYMPHOCYTES # BLD AUTO: 1.5 K/MM3 (ref 1.2–5.4)
LYMPHOCYTES NFR BLD AUTO: 15.2 % (ref 13.4–35)
MCHC RBC AUTO-ENTMCNC: 32 % (ref 30–34)
MCV RBC AUTO: 70 FL (ref 79–97)
MONOCYTES # (AUTO): 0.4 K/MM3 (ref 0–0.8)
MONOCYTES % (AUTO): 4.4 % (ref 0–7.3)
PLATELET # BLD: 299 K/MM3 (ref 140–440)
RBC # BLD AUTO: 5.4 M/MM3 (ref 3.65–5.03)

## 2021-03-24 PROCEDURE — C9113 INJ PANTOPRAZOLE SODIUM, VIA: HCPCS

## 2021-03-24 PROCEDURE — 83690 ASSAY OF LIPASE: CPT

## 2021-03-24 PROCEDURE — 71046 X-RAY EXAM CHEST 2 VIEWS: CPT

## 2021-03-24 PROCEDURE — 93005 ELECTROCARDIOGRAM TRACING: CPT

## 2021-03-24 PROCEDURE — 96374 THER/PROPH/DIAG INJ IV PUSH: CPT

## 2021-03-24 PROCEDURE — 36415 COLL VENOUS BLD VENIPUNCTURE: CPT

## 2021-03-24 PROCEDURE — 96361 HYDRATE IV INFUSION ADD-ON: CPT

## 2021-03-24 PROCEDURE — 96375 TX/PRO/DX INJ NEW DRUG ADDON: CPT

## 2021-03-24 PROCEDURE — 99284 EMERGENCY DEPT VISIT MOD MDM: CPT

## 2021-03-24 PROCEDURE — 80053 COMPREHEN METABOLIC PANEL: CPT

## 2021-03-24 PROCEDURE — 85025 COMPLETE CBC W/AUTO DIFF WBC: CPT

## 2021-03-24 NOTE — EMERGENCY DEPARTMENT REPORT
ED General Adult HPI





- General


Chief complaint: Nausea/Vomiting/Diarrhea


Stated complaint: NAUSEA/VOMITING


Time Seen by Provider: 03/24/21 10:10


Source: patient


Mode of arrival: Ambulatory


Limitations: No Limitations





- History of Present Illness


Initial comments: 





This is a pleasant 30-year-old female who presents the emergency department 

chief complaint of nausea, vomiting, epigastric pain.  She has a long history of

esophagitis and was recently hospitalized 2 weeks ago and had an upper endoscopy

that showed erosive distal esophagitis.  She has not been able to follow-up with

a Dr. Francis (GI) at this time.  She reports in the past she has had Irais-

Weller tears causing coffee-ground emesis.  She states this has not happened but 

states that only thing that stops her nausea and vomiting is Reglan, Protonix 

and Ativan.  She reports some upper abdominal cramping.  She denies any 

associated fever, chills, night sweats, headache or dizziness, blurry vision, 

chest pain or shortness of breath, weakness or any other associated symptoms.





- Related Data


                                  Previous Rx's











 Medication  Instructions  Recorded  Last Taken  Type


 


Acetaminophen [Acetaminophen TAB] 500 mg PO Q6HR PRN #20 tablet 09/16/19 Unknown

Rx


 


Pantoprazole [Protonix] 40 mg PO BID #60 tablet 03/12/21 Unknown Rx


 


LORazepam [Ativan] 0.5 mg PO BID #12 tab 03/24/21 Unknown Rx


 


Metoclopramide [Reglan] 10 mg PO TID #30 tab 03/24/21 Unknown Rx


 


Sucralfate [Carafate] 1 gm PO ACHS #120 udc 03/24/21 Unknown Rx











                                    Allergies











Allergy/AdvReac Type Severity Reaction Status Date / Time


 


ibuprofen Allergy  Hives Verified 03/24/21 09:45


 


Penicillins Allergy  Vomiting Verified 03/24/21 09:45


 


promethazine [From Phenergan] Allergy  Hives Verified 03/24/21 09:45


 


Sulfa (Sulfonamide Allergy  Vomiting Verified 03/24/21 09:45





Antibiotics)     


 


meperidine [From Demerol] AdvReac  Unknown Verified 03/24/21 09:45


 


morphine AdvReac  Vomiting Verified 03/24/21 09:45














ED Review of Systems


ROS: 


Stated complaint: NAUSEA/VOMITING


Other details as noted in HPI





Comment: All other systems reviewed and negative


Constitutional: denies: chills, fever


Eyes: denies: eye pain, eye discharge, vision change


ENT: denies: ear pain, throat pain


Respiratory: denies: cough, shortness of breath, wheezing


Cardiovascular: denies: chest pain, palpitations


Endocrine: no symptoms reported


Gastrointestinal: as per HPI, abdominal pain, nausea, vomiting, hematemesis.  

denies: diarrhea


Genitourinary: denies: urgency, dysuria, discharge


Musculoskeletal: denies: back pain, joint swelling, arthralgia


Skin: denies: rash, lesions


Neurological: denies: headache, weakness, paresthesias


Psychiatric: denies: anxiety, depression


Hematological/Lymphatic: denies: easy bleeding, easy bruising





ED Past Medical Hx





- Past Medical History


Hx Hypertension: Yes (dont take meds/recent diagnosis)


Hx Heart Attack/AMI: No


Hx GERD: Yes


Hx Liver Disease: No


Hx Renal Disease: No


Hx Sickle Cell Disease: No


Hx Seizures: No


Hx Asthma: No


Hx COPD: No


Hx HIV: No


Additional medical history: GERD.  ectopic pregnancy





- Surgical History


Hx Pacemaker: No


Hx Internal Defibrillator: No


Additional Surgical History: PARTIAL HYSTERECTOMY.  ovarian cyst removed 2017.  

ectopic pregnancy





- Social History


Smoking Status: Never Smoker


Substance Use Type: None





- Medications


Home Medications: 


                                Home Medications











 Medication  Instructions  Recorded  Confirmed  Last Taken  Type


 


Acetaminophen [Acetaminophen TAB] 500 mg PO Q6HR PRN #20 tablet 09/16/19  

Unknown Rx


 


Pantoprazole [Protonix] 40 mg PO BID #60 tablet 03/12/21  Unknown Rx


 


LORazepam [Ativan] 0.5 mg PO BID #12 tab 03/24/21  Unknown Rx


 


Metoclopramide [Reglan] 10 mg PO TID #30 tab 03/24/21  Unknown Rx


 


Sucralfate [Carafate] 1 gm PO ACHS #120 udc 03/24/21  Unknown Rx














ED Physical Exam





- General


Limitations: No Limitations


General appearance: alert, in no apparent distress





- Head


Head exam: Present: atraumatic, normocephalic





- Eye


Eye exam: Present: normal appearance, PERRL, EOMI


Pupils: Present: normal accommodation





- ENT


ENT exam: Present: normal exam, normal orophraynx, mucous membranes moist





- Neck


Neck exam: Present: normal inspection, full ROM.  Absent: tenderness, 

meningismus





- Respiratory


Respiratory exam: Present: normal lung sounds bilaterally.  Absent: respiratory 

distress, wheezes, rales, rhonchi, stridor, chest wall tenderness





- Cardiovascular


Cardiovascular Exam: Present: regular rate, normal rhythm.  Absent: systolic 

murmur, diastolic murmur, rubs, gallop





- GI/Abdominal


GI/Abdominal exam: Present: soft, distended, tenderness (epigastric tenderness 

to palpation, no rebound or guarding.), normal bowel sounds.  Absent: guarding, 

rebound, rigid





- Extremities Exam


Extremities exam: Present: normal inspection





- Back Exam


Back exam: Present: normal inspection





- Neurological Exam


Neurological exam: Present: alert, oriented X3





- Psychiatric


Psychiatric exam: Present: normal affect, normal mood





- Skin


Skin exam: Present: warm, dry, intact, normal color.  Absent: rash





ED Course


                                   Vital Signs











  03/24/21





  09:45


 


Temperature 98.9 F


 


Pulse Rate 97 H


 


Respiratory 20





Rate 


 


Blood Pressure 149/93


 


O2 Sat by Pulse 97





Oximetry 














- Reevaluation(s)


Reevaluation #1: 





03/24/21 14:08


Patient was feeling much better after medications.  She was resting comfortably 

in the bed and felt comfortable discharge home.





ED Medical Decision Making





- Lab Data


Result diagrams: 


                                 03/24/21 11:42





                                 03/24/21 11:42








                                   Lab Results











  03/24/21 03/24/21 Range/Units





  11:42 11:42 


 


WBC  9.6   (4.5-11.0)  K/mm3


 


RBC  5.40 H   (3.65-5.03)  M/mm3


 


Hgb  12.2   (10.1-14.3)  gm/dl


 


Hct  37.7   (30.3-42.9)  %


 


MCV  70 L   (79-97)  fl


 


MCH  23 L   (28-32)  pg


 


MCHC  32   (30-34)  %


 


RDW  17.1 H   (13.2-15.2)  %


 


Plt Count  299   (140-440)  K/mm3


 


Lymph % (Auto)  15.2   (13.4-35.0)  %


 


Mono % (Auto)  4.4   (0.0-7.3)  %


 


Eos % (Auto)  0.7   (0.0-4.3)  %


 


Baso % (Auto)  0.4   (0.0-1.8)  %


 


Lymph # (Auto)  1.5   (1.2-5.4)  K/mm3


 


Mono # (Auto)  0.4   (0.0-0.8)  K/mm3


 


Eos # (Auto)  0.1   (0.0-0.4)  K/mm3


 


Baso # (Auto)  0.0   (0.0-0.1)  K/mm3


 


Seg Neutrophils %  79.3 H   (40.0-70.0)  %


 


Seg Neutrophils #  7.6   (1.8-7.7)  K/mm3


 


Sodium   140  (137-145)  mmol/L


 


Potassium   3.6  (3.6-5.0)  mmol/L


 


Chloride   103.7  ()  mmol/L


 


Carbon Dioxide   22  (22-30)  mmol/L


 


Anion Gap   18  mmol/L


 


BUN   11  (7-17)  mg/dL


 


Creatinine   0.8  (0.6-1.2)  mg/dL


 


Estimated GFR   > 60  ml/min


 


BUN/Creatinine Ratio   14  %


 


Glucose   137 H  ()  mg/dL


 


Calcium   9.4  (8.4-10.2)  mg/dL


 


Total Bilirubin   0.40  (0.1-1.2)  mg/dL


 


AST   19  (5-40)  units/L


 


ALT   18  (7-56)  units/L


 


Alkaline Phosphatase   74  ()  units/L


 


Total Protein   7.8  (6.3-8.2)  g/dL


 


Albumin   4.1  (3.9-5)  g/dL


 


Albumin/Globulin Ratio   1.1  %


 


Lipase   48  (13-60)  units/L














- EKG Data


When compared to previous EKG there are: no significant change


Interpretation: normal EKG, other (Normal sinus rhythm with a ventricular rate 

of 72 bpm, no acute ST or T wave abnormalities, no STEMI, normal axis, normal 

intervals,)





- Radiology Data


Radiology results: report reviewed





Loc: ED       


Attending Dr:   


 


 


Ordering Physician: FARHAN PINDEA  


Date of Service: 03/24/21  


Procedure(s): XR chest routine 2V  


Accession Number(s): [id number]  


 


cc: FARHAN PINEDA   


 


Fluoro Time In Minutes:   


 


CHEST 2 VIEWS   


 


 INDICATION / CLINICAL INFORMATION:  


 N/V, hematemesis.  


 


 COMPARISON:   


 3/10/2021  


 


 FINDINGS:  


 


 SUPPORT DEVICES: None.  


 HEART / MEDIASTINUM: No significant abnormality.   


 LUNGS / PLEURA: No significant pulmonary or pleural abnormality. No 

pneumothorax.   


 


 ADDITIONAL FINDINGS: No significant additional findings.  


 


 IMPRESSION:  


 No significant abnormality or interval change from 3/10/2021  


 


 Signer Name: Isidro Amos MD FACR   


 Signed: 3/24/2021 10:36 AM  


 Workstation Name: [id number]   


 


 


Transcribed By: MS  


Dictated By: Isidro Amos MD  


Electronically Authenticated By: Isidro Amos MD    


Signed Date/Time: 03/24/21 1036                                


 


 


 


DD/DT: 03/24/21 1036                                                            

  


TD/TT:





- Medical Decision Making





Patient nontoxic in no acute distress.  Vital signs are stable.  She was given 

Reglan, IV fluids and Ativan as well as Protonix and she felt much better.  On 

chart review the patient was being treated for distal esophagitis and esophageal

 ulcers which she is currently taking a proton pump inhibitor twice daily.  I 

will add on Carafate and Reglan/Ativan at home and recommended outpatient GI 

follow-up.  She is instructed to return to the ER with any change or worsening 

symptoms.  She is hemodynamically stable and had no mention of coffee-ground 

emesis during this episode.  Recommend if she develops melena or coffee-ground 

emesis that she return to the ER for evaluation of a GI bleed.  She agreeable to

 this plan and all of her questions were answered.





- Differential Diagnosis


Upper GI bleed, peptic ulcer disease, cholecystitis


Critical care attestation.: 


If time is entered above; I have spent that time in minutes in the direct care 

of this critically ill patient, excluding procedure time.








ED Disposition


Clinical Impression: 


 Esophagitis, History of Irais-Weller syndrome





Dysphagia


Qualifiers:


 Dysphagia type: esophageal phase Qualified Code(s): R13.10 - Dysphagia, 

unspecified





Disposition: DC-01 TO HOME OR SELFCARE


Is pt being admited?: No


Condition: Stable


Instructions:  Esophagitis


Prescriptions: 


LORazepam [Ativan] 0.5 mg PO BID #12 tab


Sucralfate [Carafate] 1 gm PO ACHS #120 udc


Metoclopramide [Reglan] 10 mg PO TID #30 tab


Referrals: 


KOCHERLA,AMY J, MD [Primary Care Provider] - 3-5 Days


MARIA ANTONIA FRANCIS MD [Staff Physician] - 3-5 Days


Time of Disposition: 14:10

## 2021-03-24 NOTE — XRAY REPORT
CHEST 2 VIEWS 



INDICATION / CLINICAL INFORMATION:

N/V, hematemesis.



COMPARISON: 

3/10/2021



FINDINGS:



SUPPORT DEVICES: None.

HEART / MEDIASTINUM: No significant abnormality. 

LUNGS / PLEURA: No significant pulmonary or pleural abnormality. No pneumothorax. 



ADDITIONAL FINDINGS: No significant additional findings.



IMPRESSION:

No significant abnormality or interval change from 3/10/2021



Signer Name: Isidro Amos MD FACAMARI 

Signed: 3/24/2021 10:36 AM

Workstation Name: [id number]

## 2021-03-25 NOTE — ELECTROCARDIOGRAPH REPORT
[facility]

                                       

Test Date:    2021               Test Time:    12:00:45

Pat Name:     SARWAT WATTS            Department:   

Patient ID:   [id number]          Room:          

Gender:       F                        Technician:   CORDELIA

:          1970               Requested By: CARLY RILEY

Order Number: [id number]              Reading MD:   Dante Jorge

                                 Measurements

Intervals                              Axis          

Rate:         72                       P:            62

OK:           151                      QRS:          -4

QRSD:         85                       T:            13

QT:           378                                    

QTc:          414                                    

                           Interpretive Statements

Sinus rhythm

Nonspecific T abnormalities, inferior leads

No previous ECG available for comparison

Electronically Signed On 3- 8:19:25 PDT by Dante Jorge

## 2021-08-09 ENCOUNTER — OUT OF OFFICE VISIT (OUTPATIENT)
Dept: URBAN - METROPOLITAN AREA MEDICAL CENTER 33 | Facility: MEDICAL CENTER | Age: 51
End: 2021-08-09

## 2021-08-09 DIAGNOSIS — Z87.19 H/O ACUTE PANCREATITIS: ICD-10-CM

## 2021-08-09 DIAGNOSIS — R93.3 ABN FINDINGS-GI TRACT: ICD-10-CM

## 2021-08-09 DIAGNOSIS — K92.0 BLOODY EMESIS: ICD-10-CM

## 2021-08-09 DIAGNOSIS — D50.9 ANEMIA: ICD-10-CM

## 2021-08-09 PROCEDURE — 99232 SBSQ HOSP IP/OBS MODERATE 35: CPT | Performed by: INTERNAL MEDICINE

## 2021-08-09 PROCEDURE — G8427 DOCREV CUR MEDS BY ELIG CLIN: HCPCS | Performed by: INTERNAL MEDICINE

## 2021-08-09 PROCEDURE — 99222 1ST HOSP IP/OBS MODERATE 55: CPT | Performed by: INTERNAL MEDICINE

## 2021-08-10 ENCOUNTER — OUT OF OFFICE VISIT (OUTPATIENT)
Dept: URBAN - METROPOLITAN AREA MEDICAL CENTER 33 | Facility: MEDICAL CENTER | Age: 51
End: 2021-08-10

## 2021-08-10 DIAGNOSIS — K21.00 ALKALINE REFLUX ESOPHAGITIS: ICD-10-CM

## 2021-08-10 DIAGNOSIS — K22.2 ACQUIRED ESOPHAGEAL RING: ICD-10-CM

## 2021-08-10 DIAGNOSIS — K92.0 BLOODY EMESIS: ICD-10-CM

## 2021-08-10 PROCEDURE — 43235 EGD DIAGNOSTIC BRUSH WASH: CPT | Performed by: INTERNAL MEDICINE

## 2021-08-18 ENCOUNTER — HOSPITAL ENCOUNTER (EMERGENCY)
Dept: HOSPITAL 5 - ED | Age: 51
Discharge: HOME | End: 2021-08-18
Payer: COMMERCIAL

## 2021-08-18 VITALS — DIASTOLIC BLOOD PRESSURE: 75 MMHG | SYSTOLIC BLOOD PRESSURE: 148 MMHG

## 2021-08-18 DIAGNOSIS — Z98.890: ICD-10-CM

## 2021-08-18 DIAGNOSIS — K21.9: ICD-10-CM

## 2021-08-18 DIAGNOSIS — Z79.899: ICD-10-CM

## 2021-08-18 DIAGNOSIS — I10: ICD-10-CM

## 2021-08-18 DIAGNOSIS — Z88.8: ICD-10-CM

## 2021-08-18 DIAGNOSIS — Z88.0: ICD-10-CM

## 2021-08-18 DIAGNOSIS — K20.90: Primary | ICD-10-CM

## 2021-08-18 DIAGNOSIS — Z88.6: ICD-10-CM

## 2021-08-18 LAB
ALBUMIN SERPL-MCNC: 4.4 G/DL (ref 3.9–5)
ALT SERPL-CCNC: 31 UNITS/L (ref 7–56)
BUN SERPL-MCNC: 11 MG/DL (ref 7–17)
BUN/CREAT SERPL: 16 %
CALCIUM SERPL-MCNC: 9.7 MG/DL (ref 8.4–10.2)
HCT VFR BLD CALC: 37.1 % (ref 30.3–42.9)
HEMOLYSIS INDEX: 36
HGB BLD-MCNC: 11.8 GM/DL (ref 10.1–14.3)
INR PPP: 0.93 (ref 0.87–1.13)
MCHC RBC AUTO-ENTMCNC: 32 % (ref 30–34)
MCV RBC AUTO: 69 FL (ref 79–97)
PLATELET # BLD: 313 K/MM3 (ref 140–440)
RBC # BLD AUTO: 5.39 M/MM3 (ref 3.65–5.03)

## 2021-08-18 PROCEDURE — 96361 HYDRATE IV INFUSION ADD-ON: CPT

## 2021-08-18 PROCEDURE — 85027 COMPLETE CBC AUTOMATED: CPT

## 2021-08-18 PROCEDURE — 80053 COMPREHEN METABOLIC PANEL: CPT

## 2021-08-18 PROCEDURE — 96375 TX/PRO/DX INJ NEW DRUG ADDON: CPT

## 2021-08-18 PROCEDURE — 85610 PROTHROMBIN TIME: CPT

## 2021-08-18 PROCEDURE — 83690 ASSAY OF LIPASE: CPT

## 2021-08-18 PROCEDURE — 99283 EMERGENCY DEPT VISIT LOW MDM: CPT

## 2021-08-18 PROCEDURE — 36415 COLL VENOUS BLD VENIPUNCTURE: CPT

## 2021-08-18 PROCEDURE — 96374 THER/PROPH/DIAG INJ IV PUSH: CPT

## 2021-08-18 PROCEDURE — C9113 INJ PANTOPRAZOLE SODIUM, VIA: HCPCS

## 2021-08-18 NOTE — EMERGENCY DEPARTMENT REPORT
ED General Adult HPI





- General


Chief complaint: Nausea/Vomiting/Diarrhea


Stated complaint: NAUSEA GASTRITIS


Time Seen by Provider: 08/18/21 06:19


Source: patient


Mode of arrival: Ambulatory


Limitations: No Limitations





- History of Present Illness


Initial comments: 





Patient is 50 years old female with history of erosive esophagitis followed by 

[facility].  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 

night she started to have nausea, vomiting and spitting saliva.  Patient denied 

any fever or chills.  No cough or shortness of breath.  Patient denied any 

abdominal pain.  Patient was seen and admitted to [facility] last week 

and had an EGD done.  Patient has an EGD done here in the hospital in March of 

this year and found to have erosive esophagitis with white-based ulcer almost 

circumferential.


-: Last night


Associated Symptoms: denies other symptoms, nausea/vomiting





- Related Data


                                  Previous Rx's











 Medication  Instructions  Recorded  Last Taken  Type


 


Acetaminophen [Acetaminophen TAB] 500 mg PO Q6HR PRN #20 tablet 09/16/19 Unknown

Rx


 


Pantoprazole [Protonix] 40 mg PO BID #60 tablet 03/12/21 Unknown Rx


 


LORazepam [Ativan] 0.5 mg PO BID #12 tab 03/24/21 Unknown Rx


 


Metoclopramide [Reglan] 10 mg PO TID #30 tab 03/24/21 Unknown Rx


 


Sucralfate [Carafate] 1 gm PO ACHS #120 udc 03/24/21 Unknown Rx











                                    Allergies











Allergy/AdvReac Type Severity Reaction Status Date / Time


 


ibuprofen Allergy  Hives Verified 03/24/21 09:45


 


Penicillins Allergy  Vomiting Verified 03/24/21 09:45


 


promethazine [From Phenergan] Allergy  Hives Verified 03/24/21 09:45


 


Sulfa (Sulfonamide Allergy  Vomiting Verified 03/24/21 09:45





Antibiotics)     


 


meperidine [From Demerol] AdvReac  Unknown Verified 03/24/21 09:45


 


morphine AdvReac  Vomiting Verified 03/24/21 09:45














ED Review of Systems


ROS: 


Stated complaint: NAUSEA GASTRITIS


Other details as noted in HPI





Comment: All other systems reviewed and negative


Constitutional: denies: chills, fever


Respiratory: denies: cough, orthopnea, shortness of breath, SOB with exertion, 

SOB at rest, wheezing


Cardiovascular: denies: chest pain, palpitations


Gastrointestinal: nausea, vomiting.  denies: abdominal pain, hematemesis, 

melena, hematochezia


Genitourinary: denies: urgency, dysuria


Musculoskeletal: denies: back pain


Neurological: denies: headache, weakness, numbness, paresthesias, confusion, 

abnormal gait





ED Past Medical Hx





- Past Medical History


Previous Medical History?: Yes


Hx Hypertension: Yes (dont take meds/recent diagnosis)


Hx Heart Attack/AMI: No


Hx GERD: Yes


Hx Liver Disease: No


Hx Renal Disease: No


Hx Sickle Cell Disease: No


Hx Seizures: No


Hx Asthma: No


Hx COPD: No


Hx HIV: No


Additional medical history: GERD.  ectopic pregnancy





- Surgical History


Past Surgical History?: Yes


Hx Pacemaker: No


Hx Internal Defibrillator: No


Additional Surgical History: PARTIAL HYSTERECTOMY.  ovarian cyst removed 2017.  

ectopic pregnancy





- Social History


Smoking Status: Never Smoker


Substance Use Type: None





- Medications


Home Medications: 


                                Home Medications











 Medication  Instructions  Recorded  Confirmed  Last Taken  Type


 


Acetaminophen [Acetaminophen TAB] 500 mg PO Q6HR PRN #20 tablet 09/16/19  

Unknown Rx


 


Pantoprazole [Protonix] 40 mg PO BID #60 tablet 03/12/21  Unknown Rx


 


LORazepam [Ativan] 0.5 mg PO BID #12 tab 03/24/21  Unknown Rx


 


Metoclopramide [Reglan] 10 mg PO TID #30 tab 03/24/21  Unknown Rx


 


Sucralfate [Carafate] 1 gm PO ACHS #120 udc 03/24/21  Unknown Rx














ED Physical Exam





- General


Limitations: No Limitations


General appearance: alert, in no apparent distress





- Head


Head exam: Present: atraumatic, normocephalic, normal inspection





- Eye


Eye exam: Present: normal appearance, PERRL





- ENT


ENT exam: Present: normal exam, normal orophraynx, mucous membranes moist





- Neck


Neck exam: Present: normal inspection, full ROM.  Absent: tenderness, 

meningismus





- Respiratory


Respiratory exam: Present: normal lung sounds bilaterally





- Cardiovascular


Cardiovascular Exam: Present: regular rate, normal rhythm, normal heart sounds





- GI/Abdominal


GI/Abdominal exam: Present: soft, normal bowel sounds.  Absent: distended, 

tenderness, guarding, rebound, rigid, organomegaly, mass, bruit, pulsatile mass,

 hernia





- Extremities Exam


Extremities exam: Present: normal inspection, full ROM, normal capillary refill.

  Absent: tenderness, pedal edema, joint swelling, calf tenderness





- Back Exam


Back exam: Present: normal inspection, full ROM.  Absent: CVA tenderness (R), 

CVA tenderness (L)





- Neurological Exam


Neurological exam: Present: alert, oriented X3, CN II-XII intact, reflexes 

normal.  Absent: motor sensory deficit





- Psychiatric


Psychiatric exam: Present: normal mood





- Skin


Skin exam: Present: warm, intact, normal color





ED Course





                                   Vital Signs











  08/18/21 08/18/21 08/18/21





  04:50 04:54 05:15


 


Temperature 98.9 F 98.9 F 


 


Pulse Rate 69 74 74


 


Respiratory 18 18 17





Rate   


 


Blood Pressure  153/84 162/86


 


Blood Pressure 153/84  





[Right]   


 


O2 Sat by Pulse 100 100 100





Oximetry   














  08/18/21 08/18/21





  05:55 06:01


 


Temperature  


 


Pulse Rate  70


 


Respiratory  16





Rate  


 


Blood Pressure  137/83


 


Blood Pressure  





[Right]  


 


O2 Sat by Pulse 99 99





Oximetry  














ED Medical Decision Making





- Lab Data


Result diagrams: 


                                 08/18/21 05:25





                                 08/18/21 05:25





- Medical Decision Making





Patient is 50 years old female with history of erosive esophagitis followed by 

[facility].  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 

night she started to have nausea, vomiting and spitting saliva.  Patient denied 

any fever or chills.  No cough or shortness of breath.  Patient denied any 

abdominal pain.  Patient was seen and admitted to [facility] last week 

and had an EGD done.  Patient has an EGD done here in the hospital in March of 

this year and found to have erosive esophagitis with white-based ulcer almost 

circumferential.





Patient received Reglan, Protonix, lorazepam and lactated Ringer.  Labs reviewed

 and is unremarkable.  Patient stated that she is feeling much better.  I advi

sed patient to follow-up with her GI doctor in the next 2 to 3 days and to 

return to the ER if she develop any new symptoms.  Patient also given 

prescription for Reglan.


Critical care attestation.: 


If time is entered above; I have spent that time in minutes in the direct care 

of this critically ill patient, excluding procedure time.








ED Disposition


Clinical Impression: 


 Acute nausea with nonbilious vomiting, Esophagitis





Disposition: 01 HOME / SELF CARE / HOMELESS


Is pt being admited?: No


Condition: Stable


Instructions:  Nausea and Vomiting, Adult, Easy-to-Read, Esophagitis


Referrals: 


[facility] [Provider Group] - 3-5 Days

## 2021-08-18 NOTE — EVENT NOTE
Date: 08/18/21





The patient was evaluated in the emergency department for symptoms described in 

the history of present illness.  He/she was evaluated in the context of the 

global COVID-19 pandemic, which necessitated consideration that the patient 

might be at risk for infection with the virus that causes COVID-19.  

Institutional protocols and algorithms that pertain to the evaluation of 

patients at risk for COVID-19 are in a state of rapid change based on 

information released by regulatory bodies including the CDC and federal and 

state organizations.  These policies and algorithms were followed during the 

patient's care in the emergency department.  Please note that these policies, 

procedures and recommendations changed on a rapid basis.








GI: [facility]





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 

hospital, with a reported history of hiatal hernia, and endoscopically confirmed

esophagitis, presenting with nausea, vomiting, also spitting up saliva.  Has had

similar presentations to this in the past.  Has also had Irais-Weller tears.  

Reports being admitted to one of the [facility] last week for similar 

symptoms.  Reports having had an EGD last week.





Place patient on cardiac monitor.  Obtain appropriate laboratory studies and 

EKG.  Treat symptoms.  Of note, patient states that her symptoms typically 

improved with Ativan.


                                   Vital Signs











  08/18/21





  04:50


 


Temperature 98.9 F


 


Pulse Rate 69


 


Respiratory 18





Rate 


 


Blood Pressure 153/84





[Right] 


 


O2 Sat by Pulse 100





Oximetry

## 2021-08-26 ENCOUNTER — OUT OF OFFICE VISIT (OUTPATIENT)
Dept: URBAN - METROPOLITAN AREA MEDICAL CENTER 33 | Facility: MEDICAL CENTER | Age: 51
End: 2021-08-26

## 2021-08-26 DIAGNOSIS — K59.09 CHANGE IN BOWEL MOVEMENTS INTERMITTENT CONSTIPATION. URGENCY IN THE MORNING.: ICD-10-CM

## 2021-08-26 DIAGNOSIS — E87.6 HYPOKALEMIA: ICD-10-CM

## 2021-08-26 DIAGNOSIS — D50.9 ANEMIA: ICD-10-CM

## 2021-08-26 DIAGNOSIS — R11.2 ACUTE NAUSEA WITH NONBILIOUS VOMITING: ICD-10-CM

## 2021-08-26 PROCEDURE — 99225 SUBSEQUENT OBSERVATION CARE, PER DAY, FOR THE EVALUATION AND MANAGEMENT OF A PATIENT, WHICH REQUIRES AT LEAST 2 OF THESE 3 KEY COMPONENTS: AN EXPAND: CPT | Performed by: INTERNAL MEDICINE

## 2021-08-26 PROCEDURE — 99244 OFF/OP CNSLTJ NEW/EST MOD 40: CPT | Performed by: INTERNAL MEDICINE

## 2021-10-20 ENCOUNTER — TELEPHONE ENCOUNTER (OUTPATIENT)
Dept: URBAN - METROPOLITAN AREA CLINIC 17 | Facility: CLINIC | Age: 51
End: 2021-10-20

## 2021-10-20 RX ORDER — PANTOPRAZOLE SODIUM 40 MG
TAKE 1 TABLET (40 MG) BY ORAL ROUTE ONCE DAILY FOR 30 DAYS TABLET, DELAYED RELEASE (ENTERIC COATED) ORAL TWICE A DAY
Qty: 30 | Refills: 1
Start: 2018-02-01

## 2021-11-19 ENCOUNTER — TELEPHONE ENCOUNTER (OUTPATIENT)
Dept: URBAN - METROPOLITAN AREA CLINIC 92 | Facility: CLINIC | Age: 51
End: 2021-11-19

## 2021-11-19 RX ORDER — PANTOPRAZOLE SODIUM 40 MG
TAKE 1 TABLET (40 MG) BY ORAL ROUTE ONCE DAILY FOR 30 DAYS TABLET, DELAYED RELEASE (ENTERIC COATED) ORAL ONCE A DAY
Qty: 30 | Refills: 0
Start: 2018-02-01

## 2021-11-22 ENCOUNTER — TELEPHONE ENCOUNTER (OUTPATIENT)
Dept: URBAN - METROPOLITAN AREA CLINIC 17 | Facility: CLINIC | Age: 51
End: 2021-11-22

## 2021-11-22 RX ORDER — PANTOPRAZOLE SODIUM 40 MG
TAKE 1 TABLET (40 MG) BY ORAL ROUTE ONCE DAILY FOR 30 DAYS TABLET, DELAYED RELEASE (ENTERIC COATED) ORAL ONCE A DAY
Qty: 30 | Refills: 0
Start: 2018-02-01

## 2021-12-16 ENCOUNTER — OFFICE VISIT (OUTPATIENT)
Dept: URBAN - METROPOLITAN AREA CLINIC 17 | Facility: CLINIC | Age: 51
End: 2021-12-16

## 2021-12-16 ENCOUNTER — WEB ENCOUNTER (OUTPATIENT)
Dept: URBAN - METROPOLITAN AREA CLINIC 17 | Facility: CLINIC | Age: 51
End: 2021-12-16

## 2021-12-16 ENCOUNTER — LAB OUTSIDE AN ENCOUNTER (OUTPATIENT)
Dept: URBAN - METROPOLITAN AREA CLINIC 17 | Facility: CLINIC | Age: 51
End: 2021-12-16

## 2021-12-16 VITALS
HEIGHT: 64 IN | SYSTOLIC BLOOD PRESSURE: 131 MMHG | BODY MASS INDEX: 38.93 KG/M2 | TEMPERATURE: 97.1 F | HEART RATE: 82 BPM | DIASTOLIC BLOOD PRESSURE: 84 MMHG | WEIGHT: 228 LBS

## 2021-12-16 DIAGNOSIS — K21.00 GASTROESOPHAGEAL REFLUX DISEASE WITH ESOPHAGITIS WITHOUT HEMORRHAGE: ICD-10-CM

## 2021-12-16 DIAGNOSIS — K59.09 CHRONIC CONSTIPATION: ICD-10-CM

## 2021-12-16 DIAGNOSIS — Z12.11 SCREEN FOR COLON CANCER: ICD-10-CM

## 2021-12-16 DIAGNOSIS — E66.9 OBESITY (BMI 30-39.9): ICD-10-CM

## 2021-12-16 PROCEDURE — 99214 OFFICE O/P EST MOD 30 MIN: CPT | Performed by: INTERNAL MEDICINE

## 2021-12-16 RX ORDER — PANTOPRAZOLE SODIUM 40 MG
TAKE 1 TABLET (40 MG) BY ORAL ROUTE ONCE DAILY FOR 30 DAYS TABLET, DELAYED RELEASE (ENTERIC COATED) ORAL ONCE A DAY
Qty: 30 | Refills: 0 | Status: ACTIVE | COMMUNITY
Start: 2018-02-01

## 2021-12-16 RX ORDER — PANTOPRAZOLE SODIUM 40 MG/1
1 TABLET TABLET, DELAYED RELEASE ORAL ONCE A DAY
Qty: 90 | Refills: 3 | OUTPATIENT
Start: 2021-12-16

## 2021-12-16 NOTE — HPI-TODAY'S VISIT:
2019 45 yo [facility] and has no PcP. About a month ago she took 2 tabs of excedrin which upset her stomach and she had pain, nausea and vomiting of brown effluent. She went to the ER, had nl Hb, K 3.3. She was given pantoprazole, took it for 2 weeks. Symptoms have not recurred. She was told she had acid reflux which she was told years ago. She does not meds for acid reflux. EGD was done 2 yrs ago, she was told she had a MW tear from vomiting at the time. She does not recall the name of the MD. She is here today because she wants to be more regular with stools. She has never had regular BMs. Average for her is about 4 times a week spontaneously. She does not take laaxatives. She had a colonoscopy about 8 yrs ago and it was normal. She drinks about 6 glasses of water a day. 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  12/16/21 I met her again at [facility] in 8/2021 admitted for nausea and vomiting.  EGD 8/2021 showed LA grade D esophagitis, 4 cm HH, Schatki's ring She is trying to adhere to a reflux diet - noting that coffee and chocolates are her triggers.  she is doing well on Pantoprazole 40 mg daily. No more nausea or vomiting.  She needs a protonix refill.

## 2022-01-14 ENCOUNTER — OFFICE VISIT (OUTPATIENT)
Dept: URBAN - METROPOLITAN AREA SURGERY CENTER 16 | Facility: SURGERY CENTER | Age: 52
End: 2022-01-14

## 2022-06-25 ENCOUNTER — TELEPHONE ENCOUNTER (OUTPATIENT)
Dept: URBAN - METROPOLITAN AREA CLINIC 105 | Facility: CLINIC | Age: 52
End: 2022-06-25

## 2022-06-26 ENCOUNTER — OUT OF OFFICE VISIT (OUTPATIENT)
Dept: URBAN - METROPOLITAN AREA MEDICAL CENTER 33 | Facility: MEDICAL CENTER | Age: 52
End: 2022-06-26

## 2022-06-26 DIAGNOSIS — R10.13 ABDOMINAL DISCOMFORT, EPIGASTRIC: ICD-10-CM

## 2022-06-26 DIAGNOSIS — R11.2 ACUTE NAUSEA WITH NONBILIOUS VOMITING: ICD-10-CM

## 2022-06-26 DIAGNOSIS — K21.9 ACID REFLUX: ICD-10-CM

## 2022-06-26 PROCEDURE — 99222 1ST HOSP IP/OBS MODERATE 55: CPT | Performed by: INTERNAL MEDICINE

## 2022-06-26 PROCEDURE — G8427 DOCREV CUR MEDS BY ELIG CLIN: HCPCS | Performed by: INTERNAL MEDICINE

## 2022-06-27 ENCOUNTER — CLAIMS CREATED FROM THE CLAIM WINDOW (OUTPATIENT)
Dept: URBAN - METROPOLITAN AREA MEDICAL CENTER 33 | Facility: MEDICAL CENTER | Age: 52
End: 2022-06-27

## 2022-06-27 DIAGNOSIS — K31.89 ACQUIRED DEFORMITY OF DUODENUM: ICD-10-CM

## 2022-06-27 DIAGNOSIS — K20.80 ESOPHAGITIS DISSECANS SUPERFICIALIS: ICD-10-CM

## 2022-06-27 DIAGNOSIS — R11.2 ACUTE NAUSEA WITH NONBILIOUS VOMITING: ICD-10-CM

## 2022-06-27 DIAGNOSIS — K29.60 ADENOPAPILLOMATOSIS GASTRICA: ICD-10-CM

## 2022-06-27 PROCEDURE — 43239 EGD BIOPSY SINGLE/MULTIPLE: CPT | Performed by: INTERNAL MEDICINE

## 2022-06-28 ENCOUNTER — CLAIMS CREATED FROM THE CLAIM WINDOW (OUTPATIENT)
Dept: URBAN - METROPOLITAN AREA MEDICAL CENTER 33 | Facility: MEDICAL CENTER | Age: 52
End: 2022-06-28

## 2022-06-28 DIAGNOSIS — R11.2 ACUTE NAUSEA WITH NONBILIOUS VOMITING: ICD-10-CM

## 2022-06-28 PROCEDURE — 99232 SBSQ HOSP IP/OBS MODERATE 35: CPT | Performed by: INTERNAL MEDICINE

## 2022-08-12 ENCOUNTER — OFFICE VISIT (OUTPATIENT)
Dept: URBAN - METROPOLITAN AREA CLINIC 103 | Facility: CLINIC | Age: 52
End: 2022-08-12

## 2022-09-05 ENCOUNTER — OUT OF OFFICE VISIT (OUTPATIENT)
Dept: URBAN - METROPOLITAN AREA MEDICAL CENTER 33 | Facility: MEDICAL CENTER | Age: 52
End: 2022-09-05

## 2022-09-05 DIAGNOSIS — R11.2 ACUTE NAUSEA WITH NONBILIOUS VOMITING: ICD-10-CM

## 2022-09-05 DIAGNOSIS — R10.13 ABDOMINAL DISCOMFORT, EPIGASTRIC: ICD-10-CM

## 2022-09-05 DIAGNOSIS — D72.829 ELEVATED WBC COUNT: ICD-10-CM

## 2022-09-05 PROCEDURE — 99214 OFFICE O/P EST MOD 30 MIN: CPT | Performed by: INTERNAL MEDICINE

## 2022-09-06 ENCOUNTER — OUT OF OFFICE VISIT (OUTPATIENT)
Dept: URBAN - METROPOLITAN AREA MEDICAL CENTER 33 | Facility: MEDICAL CENTER | Age: 52
End: 2022-09-06

## 2022-09-06 DIAGNOSIS — D62 ABLA (ACUTE BLOOD LOSS ANEMIA): ICD-10-CM

## 2022-09-06 DIAGNOSIS — K92.0 BLOODY EMESIS: ICD-10-CM

## 2022-09-06 PROCEDURE — 99214 OFFICE O/P EST MOD 30 MIN: CPT | Performed by: INTERNAL MEDICINE

## 2022-09-30 ENCOUNTER — LAB OUTSIDE AN ENCOUNTER (OUTPATIENT)
Dept: URBAN - METROPOLITAN AREA CLINIC 105 | Facility: CLINIC | Age: 52
End: 2022-09-30

## 2022-09-30 ENCOUNTER — OFFICE VISIT (OUTPATIENT)
Dept: URBAN - METROPOLITAN AREA CLINIC 105 | Facility: CLINIC | Age: 52
End: 2022-09-30

## 2022-09-30 VITALS
BODY MASS INDEX: 38.62 KG/M2 | HEIGHT: 64 IN | TEMPERATURE: 97.4 F | WEIGHT: 226.2 LBS | HEART RATE: 76 BPM | DIASTOLIC BLOOD PRESSURE: 89 MMHG | SYSTOLIC BLOOD PRESSURE: 141 MMHG

## 2022-09-30 DIAGNOSIS — Z12.11 SCREEN FOR COLON CANCER: ICD-10-CM

## 2022-09-30 DIAGNOSIS — K59.09 CHRONIC CONSTIPATION: ICD-10-CM

## 2022-09-30 DIAGNOSIS — E66.9 OBESITY (BMI 30-39.9): ICD-10-CM

## 2022-09-30 DIAGNOSIS — K21.00 GASTROESOPHAGEAL REFLUX DISEASE WITH ESOPHAGITIS WITHOUT HEMORRHAGE: ICD-10-CM

## 2022-09-30 DIAGNOSIS — K25.7 CHRONIC GASTRIC ULCER WITHOUT HEMORRHAGE AND WITHOUT PERFORATION: ICD-10-CM

## 2022-09-30 PROBLEM — 76796008: Status: ACTIVE | Noted: 2022-09-30

## 2022-09-30 PROBLEM — 162864005: Status: ACTIVE | Noted: 2021-12-16

## 2022-09-30 PROCEDURE — 99214 OFFICE O/P EST MOD 30 MIN: CPT | Performed by: INTERNAL MEDICINE

## 2022-09-30 RX ORDER — PANTOPRAZOLE SODIUM 40 MG
TAKE 1 TABLET (40 MG) BY ORAL ROUTE ONCE DAILY FOR 30 DAYS TABLET, DELAYED RELEASE (ENTERIC COATED) ORAL ONCE A DAY
Qty: 30 | Refills: 0 | Status: ACTIVE | COMMUNITY
Start: 2018-02-01

## 2022-09-30 RX ORDER — PANTOPRAZOLE SODIUM 40 MG/1
1 TABLET TABLET, DELAYED RELEASE ORAL ONCE A DAY
Qty: 90 | Refills: 3 | OUTPATIENT

## 2022-09-30 NOTE — HPI-TODAY'S VISIT:
2019 47 yo [facility] and has no P[facility]. About a month ago she took 2 tabs of excedrin which upset her stomach and she had pain, nausea and vomiting of brown effluent. She went to the ER, had nl Hb, K 3.3. She was given pantoprazole, took it for 2 weeks. Symptoms have not recurred. She was told she had acid reflux which she was told years ago. She does not meds for acid reflux. EGD was done 2 yrs ago, she was told she had a MW tear from vomiting at the time. She does not recall the name of the MD. She is here today because she wants to be more regular with stools. She has never had regular BMs. Average for her is about 4 times a week spontaneously. She does not take laaxatives. She had a colonoscopy about 8 yrs ago and it was normal. She drinks about 6 glasses of water a day. 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  12/16/21 I met her again at [facility] in 8/2021 admitted for nausea and vomiting.  EGD 8/2021 showed LA grade D esophagitis, 4 cm HH, Schatki's ring She is trying to adhere to a reflux diet - noting that coffee and chocolates are her triggers.  she is doing well on Pantoprazole 40 mg daily. No more nausea or vomiting.  She needs a protonix refill.  9/30/22 Since she last saw me has been admitted at [facility] 6/2022 and 9/2022 In june had EGD showing " SSBE and 1cm " bx of esophagus showing chronic inflammation without IM. Feels ok now.  Taking PPI daily.  She is not diabetic.

## 2022-10-04 PROBLEM — 266433003: Status: ACTIVE | Noted: 2021-12-16

## 2022-11-16 ENCOUNTER — CLAIMS CREATED FROM THE CLAIM WINDOW (OUTPATIENT)
Dept: URBAN - METROPOLITAN AREA CLINIC 4 | Facility: CLINIC | Age: 52
End: 2022-11-16

## 2022-11-16 ENCOUNTER — CLAIMS CREATED FROM THE CLAIM WINDOW (OUTPATIENT)
Dept: URBAN - METROPOLITAN AREA SURGERY CENTER 16 | Facility: SURGERY CENTER | Age: 52
End: 2022-11-16

## 2022-11-16 DIAGNOSIS — K31.89 OTHER DISEASES OF STOMACH AND DUODENUM: ICD-10-CM

## 2022-11-16 DIAGNOSIS — K22.89 DILATATION OF ESOPHAGUS: ICD-10-CM

## 2022-11-16 DIAGNOSIS — Z87.11 H/O GASTRIC ULCER: ICD-10-CM

## 2022-11-16 PROCEDURE — 88305 TISSUE EXAM BY PATHOLOGIST: CPT | Performed by: PATHOLOGY

## 2022-11-16 PROCEDURE — G8907 PT DOC NO EVENTS ON DISCHARG: HCPCS | Performed by: INTERNAL MEDICINE

## 2022-11-16 PROCEDURE — 43239 EGD BIOPSY SINGLE/MULTIPLE: CPT | Performed by: INTERNAL MEDICINE

## 2022-11-16 RX ORDER — PANTOPRAZOLE SODIUM 40 MG
TAKE 1 TABLET (40 MG) BY ORAL ROUTE ONCE DAILY FOR 30 DAYS TABLET, DELAYED RELEASE (ENTERIC COATED) ORAL ONCE A DAY
Qty: 30 | Refills: 0 | Status: ACTIVE | COMMUNITY
Start: 2018-02-01

## 2022-11-16 RX ORDER — PANTOPRAZOLE SODIUM 40 MG/1
1 TABLET TABLET, DELAYED RELEASE ORAL ONCE A DAY
Qty: 90 | Refills: 3 | Status: ACTIVE | COMMUNITY

## 2022-12-07 ENCOUNTER — OFFICE VISIT (OUTPATIENT)
Dept: URBAN - METROPOLITAN AREA SURGERY CENTER 16 | Facility: SURGERY CENTER | Age: 52
End: 2022-12-07

## 2023-10-09 ENCOUNTER — TELEPHONE ENCOUNTER (OUTPATIENT)
Dept: URBAN - METROPOLITAN AREA CLINIC 105 | Facility: CLINIC | Age: 53
End: 2023-10-09

## 2023-10-09 RX ORDER — PANTOPRAZOLE SODIUM 40 MG/1
1 TABLET TABLET, DELAYED RELEASE ORAL ONCE A DAY
Qty: 90 | Refills: 0

## 2024-01-30 ENCOUNTER — TELEPHONE ENCOUNTER (OUTPATIENT)
Dept: URBAN - METROPOLITAN AREA CLINIC 3 | Facility: CLINIC | Age: 54
End: 2024-01-30

## 2024-01-30 RX ORDER — PANTOPRAZOLE SODIUM 40 MG/1
1 TABLET TABLET, DELAYED RELEASE ORAL ONCE A DAY
Qty: 30 | Refills: 0

## 2024-03-01 ENCOUNTER — OV EP (OUTPATIENT)
Dept: URBAN - METROPOLITAN AREA CLINIC 105 | Facility: CLINIC | Age: 54
End: 2024-03-01
Payer: COMMERCIAL

## 2024-03-01 ENCOUNTER — LAB (OUTPATIENT)
Dept: URBAN - METROPOLITAN AREA CLINIC 105 | Facility: CLINIC | Age: 54
End: 2024-03-01

## 2024-03-01 VITALS
SYSTOLIC BLOOD PRESSURE: 138 MMHG | WEIGHT: 250.2 LBS | HEART RATE: 75 BPM | HEIGHT: 64 IN | TEMPERATURE: 96.7 F | BODY MASS INDEX: 42.72 KG/M2 | DIASTOLIC BLOOD PRESSURE: 75 MMHG

## 2024-03-01 DIAGNOSIS — K59.09 CHRONIC CONSTIPATION: ICD-10-CM

## 2024-03-01 DIAGNOSIS — K25.7 CHRONIC GASTRIC ULCER WITHOUT HEMORRHAGE AND WITHOUT PERFORATION: ICD-10-CM

## 2024-03-01 DIAGNOSIS — Z12.11 SCREEN FOR COLON CANCER: ICD-10-CM

## 2024-03-01 DIAGNOSIS — E66.9 OBESITY (BMI 30-39.9): ICD-10-CM

## 2024-03-01 DIAGNOSIS — K21.00 GASTROESOPHAGEAL REFLUX DISEASE WITH ESOPHAGITIS WITHOUT HEMORRHAGE: ICD-10-CM

## 2024-03-01 PROCEDURE — 99214 OFFICE O/P EST MOD 30 MIN: CPT | Performed by: INTERNAL MEDICINE

## 2024-03-01 RX ORDER — PANTOPRAZOLE SODIUM 40 MG/1
1 TABLET TABLET, DELAYED RELEASE ORAL ONCE A DAY
Qty: 90 | Refills: 3 | OUTPATIENT

## 2024-03-01 RX ORDER — PANTOPRAZOLE SODIUM 40 MG
TAKE 1 TABLET (40 MG) BY ORAL ROUTE ONCE DAILY FOR 30 DAYS TABLET, DELAYED RELEASE (ENTERIC COATED) ORAL ONCE A DAY
Qty: 30 | Refills: 0 | Status: ACTIVE | COMMUNITY
Start: 2018-02-01

## 2024-03-01 RX ORDER — POLYETHYLENE GLYCOL-3350 AND ELECTROLYTES WITH FLAVOR PACK 240; 5.84; 2.98; 6.72; 22.72 G/278.26G; G/278.26G; G/278.26G; G/278.26G; G/278.26G
AS DIRECTED POWDER, FOR SOLUTION ORAL 1
Qty: 1 | Refills: 0 | OUTPATIENT
Start: 2024-03-01 | End: 2024-03-02

## 2024-03-01 NOTE — EXAM-PHYSICAL EXAM
Gen: Alert, oriented, in no distress Heent: no icterus, lips wnl Lungs: bilateral breath sounds CVS: first and second heart sounds Abdomen: full, soft, non tender Ext: no edema Joints: normal joints and symmetry Spine: consistent with age Skin: no rash on exposed areas Neuro: no focal localizing signs 700cc LR

## 2024-03-01 NOTE — HPI-TODAY'S VISIT:
2019 47 [facility] and has no [facility]. About a month ago she took 2 tabs of excedrin which upset her stomach and she had pain, nausea and vomiting of brown effluent. She went to the ER, had nl Hb, K 3.3. She was given pantoprazole, took it for 2 weeks. Symptoms have not recurred. She was told she had acid reflux which she was told years ago. She does not meds for acid reflux. EGD was done 2 yrs ago, she was told she had a MW tear from vomiting at the time. She does not recall the name of the MD. She is here today because she wants to be more regular with stools. She has never had regular BMs. Average for her is about 4 times a week spontaneously. She does not take laaxatives. She had a colonoscopy about 8 yrs ago and it was normal. She drinks about 6 glasses of water a day. 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  12/16/21 I met her again at [facility] in 8/2021 admitted for nausea and vomiting.  EGD 8/2021 showed LA grade D esophagitis, 4 cm HH, Schatki's ring She is trying to adhere to a reflux diet - noting that coffee and chocolates are her triggers.  she is doing well on Pantoprazole 40 mg daily. No more nausea or vomiting.  She needs a protonix refill.  9/30/22 Since she last saw me has been admitted at [facility] 6/2022 and 9/2022 In june had EGD showing " SSBE and 1cm " bx of esophagus showing chronic inflammation without IM. Feels ok now.  Taking PPI daily.  She is not diabetic.  3/1/24 STill taking protonix 40 mg daily Says over new years had nausea and vomting. Found to have a UTI And treated and this has resolved. No current GI complaints.